# Patient Record
Sex: FEMALE | Race: WHITE | HISPANIC OR LATINO | Employment: UNEMPLOYED | URBAN - METROPOLITAN AREA
[De-identification: names, ages, dates, MRNs, and addresses within clinical notes are randomized per-mention and may not be internally consistent; named-entity substitution may affect disease eponyms.]

---

## 2017-03-22 ENCOUNTER — APPOINTMENT (OUTPATIENT)
Dept: OBGYN | Facility: CLINIC | Age: 22
End: 2017-03-22

## 2017-04-03 ENCOUNTER — HOSPITAL ENCOUNTER (OUTPATIENT)
Facility: MEDICAL CENTER | Age: 22
End: 2017-04-03
Attending: OBSTETRICS & GYNECOLOGY
Payer: COMMERCIAL

## 2017-04-03 ENCOUNTER — INITIAL PRENATAL (OUTPATIENT)
Dept: OBGYN | Facility: CLINIC | Age: 22
End: 2017-04-03
Payer: MEDICAID

## 2017-04-03 VITALS
HEIGHT: 62 IN | WEIGHT: 126 LBS | SYSTOLIC BLOOD PRESSURE: 102 MMHG | BODY MASS INDEX: 23.19 KG/M2 | DIASTOLIC BLOOD PRESSURE: 56 MMHG

## 2017-04-03 DIAGNOSIS — N39.0 URINARY TRACT INFECTION WITHOUT HEMATURIA, SITE UNSPECIFIED: ICD-10-CM

## 2017-04-03 DIAGNOSIS — Z34.90 ENCOUNTER FOR SUPERVISION OF NORMAL PREGNANCY, ANTEPARTUM, UNSPECIFIED GRAVIDITY: ICD-10-CM

## 2017-04-03 LAB
APPEARANCE UR: NORMAL
BILIRUB UR STRIP-MCNC: NORMAL MG/DL
COLOR UR AUTO: NORMAL
GLUCOSE UR STRIP.AUTO-MCNC: NEGATIVE MG/DL
KETONES UR STRIP.AUTO-MCNC: NEGATIVE MG/DL
LEUKOCYTE ESTERASE UR QL STRIP.AUTO: NORMAL
NITRITE UR QL STRIP.AUTO: POSITIVE
PH UR STRIP.AUTO: 6 [PH] (ref 5–8)
PROT UR QL STRIP: 30 MG/DL
RBC UR QL AUTO: NORMAL
SP GR UR STRIP.AUTO: 1.01
UROBILINOGEN UR STRIP-MCNC: NORMAL MG/DL

## 2017-04-03 PROCEDURE — 59402 PR NEW OB HIGH RISK: CPT | Performed by: OBSTETRICS & GYNECOLOGY

## 2017-04-03 PROCEDURE — 81002 URINALYSIS NONAUTO W/O SCOPE: CPT | Performed by: OBSTETRICS & GYNECOLOGY

## 2017-04-03 RX ORDER — NITROFURANTOIN 25; 75 MG/1; MG/1
100 CAPSULE ORAL 2 TIMES DAILY
Qty: 20 CAP | Refills: 0 | Status: SHIPPED | OUTPATIENT
Start: 2017-04-03 | End: 2017-05-18

## 2017-04-03 NOTE — PROGRESS NOTES
Pt here for New OB today  Phone: [phone]  Pharmacy verified  Pt is taking PNV  Pt had U/S on 12-30-16; AYLIN by U/S 6-12-17  Declines BTL  Pt reports good FM, denies any VB, LOF and UC

## 2017-04-03 NOTE — MR AVS SNAPSHOT
"        Gissell Richardson Colon   4/3/2017 3:00 PM   Initial Prenatal   MRN: [id number]    [facility]   Dept Phone:  [phone]    Description:  Female : 1995   Provider:  Sharlene Padgett M.D.           Allergies as of 4/3/2017     Allergen Noted Reactions    Nyquil 2011   Swelling      You were diagnosed with     Encounter for supervision of normal pregnancy, antepartum, unspecified    [[id number]]         Vital Signs     Blood Pressure Height Weight Body Mass Index Breastfeeding? Smoking Status    102/56 mmHg 1.575 m (5' 2\") 57.153 kg (126 lb) 23.04 kg/m2 Unknown Never Smoker       Basic Information     Date Of Birth Sex Race Ethnicity Preferred Language    1995 Female White  Origin (Mauritanian,Costa Rican,Uzbek,Tate, etc) English      Your appointments     2017  3:15 PM   OB Follow Up with IDALIA FERNANDEZ   [facility] [address] [id number] [phone]              Health Maintenance        Date Due Completion Dates    IMM HEP B VACCINE (1 of 3 - Primary Series) 1994 ---    IMM HEP A VACCINE (1 of 2 - Standard Series) 1995 ---    IMM HPV VACCINE (1 of 3 - Female 3 Dose Series) 2005 ---    IMM VARICELLA (CHICKENPOX) VACCINE (1 of 2 - 2 Dose Adolescent Series) 2007 ---    IMM DTaP/Tdap/Td Vaccine (1 - Tdap) 2013 ---    PAP SMEAR 2015            Results     POCT Urinalysis      Component Value Standard Range & Units    POC Color  Negative    POC Appearance  Negative    POC Leukocyte Esterase MODERATE Negative    POC Nitrites POSITIVE Negative    POC Urobiligen  Negative (0.2) mg/dL    POC Protein 30 Negative mg/dL    POC Urine PH 6.0 5.0 - 8.0    POC Blood HEMOLYZED Negative    POC Specific Gravity 1.015 <1.005 - >1.030    POC Ketones NEGATIVE Negative mg/dL    POC Biliruben  Negative mg/dL    POC Glucose NEGATIVE Negative mg/dL                        Current Immunizations     Influenza TIV " (IM) 2/2/2012  4:45 PM      Below and/or attached are the medications your provider expects you to take. Review all of your home medications and newly ordered medications with your provider and/or pharmacist. Follow medication instructions as directed by your provider and/or pharmacist. Please keep your medication list with you and share with your provider. Update the information when medications are discontinued, doses are changed, or new medications (including over-the-counter products) are added; and carry medication information at all times in the event of emergency situations     Allergies:  NYQUIL - Swelling               Medications  Valid as of: April 03, 2017 -  3:05 PM    Generic Name Brand Name Tablet Size Instructions for use    Doxylamine Succinate (Sleep) (Tab) UNISOM 25 MG Take 1 Tab by mouth every 6 hours as needed.        Prenatal Multivit-Min-Fe-FA   Take  by mouth.        .                 Medicines prescribed today were sent to:     [facility] [address] [place] [address] - [address] [facility]    [address] [place] [address]    Phone: [phone] Fax: [phone]    Open 24 Hours?: No      Medication refill instructions:       If your prescription bottle indicates you have medication refills left, it is not necessary to call your provider’s office. Please contact your pharmacy and they will refill your medication.    If your prescription bottle indicates you do not have any refills left, you may request refills at any time through one of the following ways: The online [employer] system (except Urgent Care), by calling your provider’s office, or by asking your pharmacy to contact your provider’s office with a refill request. Medication refills are processed only during regular business hours and may not be available until the next business day. Your provider may request additional information or to have a follow-up visit with you prior to refilling your medication.   *Please Note: Medication refills are  assigned a new Rx number when refilled electronically. Your pharmacy may indicate that no refills were authorized even though a new prescription for the same medication is available at the pharmacy. Please request the medicine by name with the pharmacy before contacting your provider for a refill.        Your To Do List     Future Labs/Procedures Complete By Expires    PREG CNTR PRENATAL PN  As directed 4/4/2018    THINPREP RFLX HPV ASCUS W/CTNG  As directed 4/4/2018    US-OB 2ND 3RD TRI COMPLETE  As directed 4/3/2018         [employer] Access Code: Activation code not generated  Current PredPol Status: Active

## 2017-04-03 NOTE — PROGRESS NOTES
"Subjective:      Gissell Colon is a 21 y.o. female who presents with No chief complaint on file.        CC: NOB    HPI 22 yo  @ 30+0/7 wks (unsure lmp, AYLIN by \"keepsake US\" at 16+4/7 wks - I reviewed photos and saw nothing measured that would determine dates).  She c/o dysuria.  No contractions, LOF, VB.  Fetus active.    ROS REVIEW OF SYSTEMS:    Pertinent positives and negatives mentioned in HPI.    All other systems reviewed and are negative.     Objective:     /56 mmHg  Ht 1.575 m (5' 2\")  Wt 57.153 kg (126 lb)  BMI 23.04 kg/m2  Breastfeeding? Unknown     Physical Exam     See prenatal physcial       Assessment/Plan:     1. Encounter for supervision of normal pregnancy, antepartum, unspecified   Continue PNV.  Declines CF testing.   - CONSENT FOR CYSTIC FIBROSIS CARRIER TEST  - POCT Urinalysis  - PREG CNTR PRENATAL PN; Future  - THINPREP RFLX HPV ASCUS W/CTNG; Future  - US-OB 2ND 3RD TRI COMPLETE; Future  - GLUCOSE 1HR GESTATIONAL    2. Urinary tract infection without hematuria, site unspecified  Will call if culture results not covered by macrobid.  - nitrofurantoin monohydr macro (MACROBID) 100 MG Cap; Take 1 Cap by mouth 2 times a day.  Dispense: 20 Cap; Refill: 0    3. S<D - poor dating criteria by \"keepsake US\" and unsure lmp.  Fetal survey ordered today.    F/U 2 weeks.         "

## 2017-04-06 LAB
C TRACH DNA GENITAL QL NAA+PROBE: NEGATIVE
CYTOLOGY REG CYTOL: NORMAL
N GONORRHOEA DNA GENITAL QL NAA+PROBE: NEGATIVE
SPECIMEN SOURCE: NORMAL

## 2017-04-10 ENCOUNTER — TELEPHONE (OUTPATIENT)
Dept: OBGYN | Facility: CLINIC | Age: 22
End: 2017-04-10

## 2017-04-10 NOTE — TELEPHONE ENCOUNTER
----- Message from Sharlene Padgett M.D. sent at 4/9/2017  9:00 AM PDT -----  Please inform patient of normal pap smear.  + for yeast - recommend 7 day OTC monostat if symptomatic.      4/10/17 1053 Left message for pt to call back.   4/14/17 1406 Left message for pt to call back.   1446 pt called back and notified as above. Pt states she is having itching and heavy white d/c. Advised to use Monistat 7 with x7days and to wash underwear with hot water and soap. Pt agreed and verbalized understanding.

## 2017-04-13 ENCOUNTER — APPOINTMENT (OUTPATIENT)
Dept: RADIOLOGY | Facility: IMAGING CENTER | Age: 22
End: 2017-04-13
Attending: OBSTETRICS & GYNECOLOGY

## 2017-04-13 DIAGNOSIS — Z34.90 ENCOUNTER FOR SUPERVISION OF NORMAL PREGNANCY, ANTEPARTUM, UNSPECIFIED GRAVIDITY: ICD-10-CM

## 2017-04-13 PROCEDURE — 76805 OB US >/= 14 WKS SNGL FETUS: CPT | Mod: 26 | Performed by: OBSTETRICS & GYNECOLOGY

## 2017-04-18 ENCOUNTER — ROUTINE PRENATAL (OUTPATIENT)
Dept: OBGYN | Facility: CLINIC | Age: 22
End: 2017-04-18

## 2017-04-18 ENCOUNTER — HOSPITAL ENCOUNTER (OUTPATIENT)
Dept: LAB | Facility: MEDICAL CENTER | Age: 22
End: 2017-04-18
Attending: OBSTETRICS & GYNECOLOGY
Payer: COMMERCIAL

## 2017-04-18 VITALS — WEIGHT: 127 LBS | DIASTOLIC BLOOD PRESSURE: 62 MMHG | BODY MASS INDEX: 23.22 KG/M2 | SYSTOLIC BLOOD PRESSURE: 100 MMHG

## 2017-04-18 DIAGNOSIS — Z34.03 ENCOUNTER FOR SUPERVISION OF NORMAL FIRST PREGNANCY IN THIRD TRIMESTER: ICD-10-CM

## 2017-04-18 DIAGNOSIS — Z34.90 ENCOUNTER FOR SUPERVISION OF NORMAL PREGNANCY, ANTEPARTUM, UNSPECIFIED GRAVIDITY: ICD-10-CM

## 2017-04-18 DIAGNOSIS — O09.30 LATE PRENATAL CARE: ICD-10-CM

## 2017-04-18 PROBLEM — Z34.93 SUPERVISION OF NORMAL PREGNANCY IN THIRD TRIMESTER: Status: ACTIVE | Noted: 2017-04-18

## 2017-04-18 LAB
ABO GROUP BLD: NORMAL
APPEARANCE UR: ABNORMAL
BACTERIA #/AREA URNS HPF: ABNORMAL /HPF
BASOPHILS # BLD AUTO: 0.2 % (ref 0–1.8)
BASOPHILS # BLD: 0.02 K/UL (ref 0–0.12)
BILIRUB UR QL STRIP.AUTO: NEGATIVE
BLD GP AB SCN SERPL QL: NORMAL
COLOR UR: YELLOW
CULTURE IF INDICATED INDCX: YES UA CULTURE
EOSINOPHIL # BLD AUTO: 0.05 K/UL (ref 0–0.51)
EOSINOPHIL NFR BLD: 0.5 % (ref 0–6.9)
EPI CELLS #/AREA URNS HPF: ABNORMAL /HPF
ERYTHROCYTE [DISTWIDTH] IN BLOOD BY AUTOMATED COUNT: 38.9 FL (ref 35.9–50)
GLUCOSE 1H P 50 G GLC PO SERPL-MCNC: 85 MG/DL (ref 70–139)
GLUCOSE UR STRIP.AUTO-MCNC: NEGATIVE MG/DL
HBV SURFACE AG SER QL: NEGATIVE
HCT VFR BLD AUTO: 35.3 % (ref 37–47)
HGB BLD-MCNC: 11.1 G/DL (ref 12–16)
HIV 1+2 AB+HIV1 P24 AG SERPL QL IA: NON REACTIVE
IMM GRANULOCYTES # BLD AUTO: 0.06 K/UL (ref 0–0.11)
IMM GRANULOCYTES NFR BLD AUTO: 0.7 % (ref 0–0.9)
KETONES UR STRIP.AUTO-MCNC: NEGATIVE MG/DL
LEUKOCYTE ESTERASE UR QL STRIP.AUTO: ABNORMAL
LYMPHOCYTES # BLD AUTO: 1.73 K/UL (ref 1–4.8)
LYMPHOCYTES NFR BLD: 18.9 % (ref 22–41)
MCH RBC QN AUTO: 25.6 PG (ref 27–33)
MCHC RBC AUTO-ENTMCNC: 31.4 G/DL (ref 33.6–35)
MCV RBC AUTO: 81.3 FL (ref 81.4–97.8)
MICRO URNS: ABNORMAL
MONOCYTES # BLD AUTO: 0.45 K/UL (ref 0–0.85)
MONOCYTES NFR BLD AUTO: 4.9 % (ref 0–13.4)
MUCOUS THREADS #/AREA URNS HPF: ABNORMAL /HPF
NEUTROPHILS # BLD AUTO: 6.84 K/UL (ref 2–7.15)
NEUTROPHILS NFR BLD: 74.8 % (ref 44–72)
NITRITE UR QL STRIP.AUTO: POSITIVE
NRBC # BLD AUTO: 0 K/UL
NRBC BLD AUTO-RTO: 0 /100 WBC
PH UR STRIP.AUTO: 6 [PH]
PLATELET # BLD AUTO: 297 K/UL (ref 164–446)
PMV BLD AUTO: 10.3 FL (ref 9–12.9)
PROT UR QL STRIP: NEGATIVE MG/DL
RBC # BLD AUTO: 4.34 M/UL (ref 4.2–5.4)
RBC # URNS HPF: ABNORMAL /HPF
RBC UR QL AUTO: NEGATIVE
RH BLD: NORMAL
RUBV AB SER QL: 55.6 IU/ML
SP GR UR STRIP.AUTO: 1.02
TREPONEMA PALLIDUM IGG+IGM AB [PRESENCE] IN SERUM OR PLASMA BY IMMUNOASSAY: NON REACTIVE
WBC # BLD AUTO: 9.2 K/UL (ref 4.8–10.8)
WBC #/AREA URNS HPF: ABNORMAL /HPF

## 2017-04-18 PROCEDURE — 90715 TDAP VACCINE 7 YRS/> IM: CPT | Performed by: OBSTETRICS & GYNECOLOGY

## 2017-04-18 PROCEDURE — 90471 IMMUNIZATION ADMIN: CPT | Performed by: OBSTETRICS & GYNECOLOGY

## 2017-04-18 PROCEDURE — 90040 PR PRENATAL FOLLOW UP: CPT | Performed by: OBSTETRICS & GYNECOLOGY

## 2017-04-18 NOTE — Clinical Note
"Count Your Baby's Movements  Another step to a healthy delivery    Gissell Richardson Colon             Dept: [phone]    How Many Weeks Pregnant 32w1d    Date to Begin Countin17              How to use this chart    One way for your physician to keep track of your baby's health is by knowing how often the baby moves (or \"kicks\") in your womb.  You can help your physician to do this by using this chart every day.    Every day, you should see how many hours it takes for your baby to move 10 times.  Start in the morning, as soon as you get up.    · First, write down the time your baby moves until you get to 10.  · Check off one box every time your baby moves until you get to 10.  · Write down the time you finished counting in the last column.  · Total how long it took to count up all 10 movements.  · Finally, fill in the box that shows how long this took.  After counting 10 movements, you no longer have to count any more that day.  The next morning, just start counting again as soon as you get up.    What should you call a \"movement\"?  It is hard to say, because it will feel different from one mother to another and from one pregnancy to the next.  The important thing is that you count the movements the same way throughout your pregnancy.  If you have more questions, you should ask your physician.    Count carefully every day!  SAMPLE:  Week 28    How many hours did it take to feel 10 movements?       Start  Time     1     2     3     4     5     6     7     8     9     10   Finish Time   Mon 8:20 ·  ·  ·  ·  ·  ·  ·  ·  ·  ·  11:40                  Sat               Sun                 IMPORTANT: You should contact your physician if it takes more than two hours for you to feel 10 movements.  Each morning, write down the time and start to count the movements of your baby.  Keep track by checking off one box every time you feel one movement.  When you have " "felt 10 \"kicks\", write down the time you finished counting in the last column.  Then fill in the   box (over the check adelita) for the number of hours it took.  Be sure to read the complete instructions on the previous page.            "

## 2017-04-18 NOTE — MR AVS SNAPSHOT
Gissell Colon   2017 3:15 PM   Routine Prenatal   MRN: [id number]    [facility]   Dept Phone:  [phone]    Description:  Female : 1995   Provider:  Sharlene Padgett M.D.           Allergies as of 2017     Allergen Noted Reactions    Nyquil 2011   Swelling      Vital Signs     Blood Pressure Weight Smoking Status             100/62 mmHg 57.607 kg (127 lb) Never Smoker          Basic Information     Date Of Birth Sex Race Ethnicity Preferred Language    1995 Female White  Origin (Estonian,Panamanian,Mosotho,Tate, etc) English      Health Maintenance        Date Due Completion Dates    IMM HEP B VACCINE (1 of 3 - Primary Series) 1994 ---    IMM HEP A VACCINE (1 of 2 - Standard Series) 1995 ---    IMM HPV VACCINE (1 of 3 - Female 3 Dose Series) 2005 ---    IMM VARICELLA (CHICKENPOX) VACCINE (1 of 2 - 2 Dose Adolescent Series) 2007 ---    IMM DTaP/Tdap/Td Vaccine (1 - Tdap) 2013 ---    PAP SMEAR 4/3/2020 4/3/2017, 2011            Current Immunizations     Influenza TIV (IM) 2012  4:45 PM      Below and/or attached are the medications your provider expects you to take. Review all of your home medications and newly ordered medications with your provider and/or pharmacist. Follow medication instructions as directed by your provider and/or pharmacist. Please keep your medication list with you and share with your provider. Update the information when medications are discontinued, doses are changed, or new medications (including over-the-counter products) are added; and carry medication information at all times in the event of emergency situations     Allergies:  NYQUIL - Swelling               Medications  Valid as of: 2017 -  3:57 PM    Generic Name Brand Name Tablet Size Instructions for use    Doxylamine Succinate (Sleep) (Tab) UNISOM 25 MG Take 1 Tab by mouth every 6 hours as needed.        Nitrofurantoin  Monohyd Macro (Cap) MACROBID 100 MG Take 1 Cap by mouth 2 times a day.        Prenatal Multivit-Min-Fe-FA   Take  by mouth.        .                 Medicines prescribed today were sent to:     [facility] - [place], [place] - [address] [place] [address]    Phone: [phone] Fax: [phone]    Open 24 Hours?: No      Medication refill instructions:       If your prescription bottle indicates you have medication refills left, it is not necessary to call your provider’s office. Please contact your pharmacy and they will refill your medication.    If your prescription bottle indicates you do not have any refills left, you may request refills at any time through one of the following ways: The online [employer] system (except Urgent Care), by calling your provider’s office, or by asking your pharmacy to contact your provider’s office with a refill request. Medication refills are processed only during regular business hours and may not be available until the next business day. Your provider may request additional information or to have a follow-up visit with you prior to refilling your medication.   *Please Note: Medication refills are assigned a new Rx number when refilled electronically. Your pharmacy may indicate that no refills were authorized even though a new prescription for the same medication is available at the pharmacy. Please request the medicine by name with the pharmacy before contacting your provider for a refill.           [employer] Access Code: Activation code not generated  Current Convers[employer] Status: Active

## 2017-04-18 NOTE — PROGRESS NOTES
Pt. Here for OB/F/U today Kick Count sheet given and explained to pt.   Good FM  Good # [phone]  Pt  States having more episodes of dizziness.   Declined BTL  Tdap vaccine given today, left deltoid. Screening checklist reviewed with pt.

## 2017-04-18 NOTE — PROGRESS NOTES
21 y.o.  32w1d The patient is here for routine obstetrical followup. She is without complaints and reports good fetal movement. She denies contractions, vaginal bleeding, or leaking of fluid.    The patient's pregnancy is complicated by There are no active problems to display for this patient.        Followup in {NUMBER 1-12:85855} weeks   labor precautions were discussed with patient  Fetal kick counts were discussed with patient  Quad screen today {YES / NO:89055} quad screen declined by patient {YES / NO:27950}  One-hour Glucola with CBC ordered today {YES / NO:51690}  GBS culture ordered today {YES / NO:28994}

## 2017-04-21 LAB
BACTERIA UR CULT: ABNORMAL
SIGNIFICANT IND 70042: ABNORMAL
SOURCE SOURCE: ABNORMAL

## 2017-04-24 ENCOUNTER — TELEPHONE (OUTPATIENT)
Dept: OBGYN | Facility: CLINIC | Age: 22
End: 2017-04-24

## 2017-04-24 RX ORDER — NITROFURANTOIN 25; 75 MG/1; MG/1
100 CAPSULE ORAL 2 TIMES DAILY
Qty: 14 CAP | Refills: 0 | Status: SHIPPED | OUTPATIENT
Start: 2017-04-24 | End: 2017-05-18

## 2017-04-24 NOTE — TELEPHONE ENCOUNTER
----- Message from Sharlene Padgett M.D. sent at 4/24/2017  8:36 AM PDT -----  Please call patient and tell her I have placed a script for Nitrofurantoin for UTI.      4/24/17 8975 Left message for pt to call back.   1001 Pt notified of UTI and needs to start on antibiotics, instructions given. Advised pt to increase water intake to minimum of 4 lt of water a day. Rx sent by provider. Pharmacy verified with pt.

## 2017-05-02 ENCOUNTER — ROUTINE PRENATAL (OUTPATIENT)
Dept: OBGYN | Facility: CLINIC | Age: 22
End: 2017-05-02

## 2017-05-02 VITALS — SYSTOLIC BLOOD PRESSURE: 104 MMHG | BODY MASS INDEX: 23.95 KG/M2 | WEIGHT: 131 LBS | DIASTOLIC BLOOD PRESSURE: 60 MMHG

## 2017-05-02 DIAGNOSIS — Z34.83 ENCOUNTER FOR SUPERVISION OF OTHER NORMAL PREGNANCY IN THIRD TRIMESTER: Primary | ICD-10-CM

## 2017-05-02 PROCEDURE — 90040 PR PRENATAL FOLLOW UP: CPT | Performed by: NURSE PRACTITIONER

## 2017-05-02 NOTE — PROGRESS NOTES
S:  Pt is  at 34w1d for routine OB follow up.  Reports occas CTX.  Reports good FM.  Denies VB, LOF, RUCs or vaginal DC.    O:  Please see above vitals.        FHTs: 135        Fundal ht: 32 cm.        S=D    A:  IUP at 34w1d  Patient Active Problem List    Diagnosis Date Noted   • Supervision of normal pregnancy in third trimester 2017   • Late prenatal care 2017        P:  1.  GBS @ 35 wks.          2.  Continue FKCs.          3.  Questions answered.          4.  Encouraged pt to tour L&D.          5.  Encourage adequate water intake.        6.  F/u 1 wks.

## 2017-05-02 NOTE — PATIENT INSTRUCTIONS
1.  GBS @ 35 wks.          2.  Continue FKCs.          3.  Questions answered.          4.  Encouraged pt to tour L&D.          5.  Encourage adequate water intake.        6.  F/u 1 wks.

## 2017-05-02 NOTE — PROGRESS NOTES
OB f/u today  + fetal movement.  No VB, LOF or UC's.  Good phone # [phone]  Preferred pharmacy confirmed.  Pt c/o Davon Saleem

## 2017-05-02 NOTE — MR AVS SNAPSHOT
Gissell Colon   2017 8:45 AM   Routine Prenatal   MRN: [id number]    [facility]   Dept Phone:  [phone]    Description:  Female : 1995   Provider:  YESSY Guillaume           Allergies as of 2017     Allergen Noted Reactions    Nyquil 2011   Swelling      You were diagnosed with     Encounter for supervision of other normal pregnancy in third trimester   [[id number]]  -  Primary       Vital Signs     Blood Pressure Weight Smoking Status             104/60 mmHg 59.421 kg (131 lb) Never Smoker          Basic Information     Date Of Birth Sex Race Ethnicity Preferred Language    1995 Female White  Origin (Qatari,Honduran,Irish,Chadian, etc) English      Your appointments     May 09, 2017 10:00 AM   OB Follow Up with YESSY Guillaume   [facility] [address]  [facility] [id number] [phone]              Problem List              ICD-10-CM Priority Class Noted - Resolved    Supervision of normal pregnancy in third trimester Z34.93   2017 - Present    Late prenatal care O09.30   2017 - Present      Health Maintenance        Date Due Completion Dates    IMM HEP B VACCINE (1 of 3 - Primary Series) 1994 ---    IMM HEP A VACCINE (1 of 2 - Standard Series) 1995 ---    IMM HPV VACCINE (1 of 3 - Female 3 Dose Series) 2005 ---    IMM VARICELLA (CHICKENPOX) VACCINE (1 of 2 - 2 Dose Adolescent Series) 2007 ---    PAP SMEAR 4/3/2020 4/3/2017, 2011    IMM DTaP/Tdap/Td Vaccine (2 - Td) 2027            Current Immunizations     Influenza TIV (IM) 2012  4:45 PM    Tdap Vaccine 2017      Below and/or attached are the medications your provider expects you to take. Review all of your home medications and newly ordered medications with your provider and/or pharmacist. Follow medication instructions as directed by your provider and/or pharmacist. Please keep your  medication list with you and share with your provider. Update the information when medications are discontinued, doses are changed, or new medications (including over-the-counter products) are added; and carry medication information at all times in the event of emergency situations     Allergies:  NYQUIL - Swelling               Medications  Valid as of: May 02, 2017 -  9:13 AM    Generic Name Brand Name Tablet Size Instructions for use    Doxylamine Succinate (Sleep) (Tab) UNISOM 25 MG Take 1 Tab by mouth every 6 hours as needed.        Nitrofurantoin Monohyd Macro (Cap) MACROBID 100 MG Take 1 Cap by mouth 2 times a day.        Nitrofurantoin Monohyd Macro (Cap) MACROBID 100 MG Take 1 Cap by mouth 2 times a day.        Prenatal Multivit-Min-Fe-FA   Take  by mouth.        .                 Medicines prescribed today were sent to:     [facility] [address] [place] [address], [place] - [address]    [address] [place] [address]    Phone: [phone] Fax: [phone]    Open 24 Hours?: No      Medication refill instructions:       If your prescription bottle indicates you have medication refills left, it is not necessary to call your provider’s office. Please contact your pharmacy and they will refill your medication.    If your prescription bottle indicates you do not have any refills left, you may request refills at any time through one of the following ways: The online [employer] system (except Urgent Care), by calling your provider’s office, or by asking your pharmacy to contact your provider’s office with a refill request. Medication refills are processed only during regular business hours and may not be available until the next business day. Your provider may request additional information or to have a follow-up visit with you prior to refilling your medication.   *Please Note: Medication refills are assigned a new Rx number when refilled electronically. Your pharmacy may indicate that no refills were authorized even though a  new prescription for the same medication is available at the pharmacy. Please request the medicine by name with the pharmacy before contacting your provider for a refill.        Instructions          1.  GBS @ 35 wks.          2.  Continue FKCs.          3.  Questions answered.          4.  Encouraged pt to tour L&D.          5.  Encourage adequate water intake.        6.  F/u 1 wks.          MyChart Access Code: Activation code not generated  Current MyChart Status: Active

## 2017-05-09 ENCOUNTER — HOSPITAL ENCOUNTER (OUTPATIENT)
Facility: MEDICAL CENTER | Age: 22
End: 2017-05-09
Attending: NURSE PRACTITIONER
Payer: COMMERCIAL

## 2017-05-09 ENCOUNTER — ROUTINE PRENATAL (OUTPATIENT)
Dept: OBGYN | Facility: CLINIC | Age: 22
End: 2017-05-09

## 2017-05-09 VITALS — DIASTOLIC BLOOD PRESSURE: 64 MMHG | SYSTOLIC BLOOD PRESSURE: 110 MMHG | BODY MASS INDEX: 23.95 KG/M2 | WEIGHT: 131 LBS

## 2017-05-09 DIAGNOSIS — Z34.83 ENCOUNTER FOR SUPERVISION OF OTHER NORMAL PREGNANCY IN THIRD TRIMESTER: ICD-10-CM

## 2017-05-09 DIAGNOSIS — Z34.83 ENCOUNTER FOR SUPERVISION OF OTHER NORMAL PREGNANCY IN THIRD TRIMESTER: Primary | ICD-10-CM

## 2017-05-09 PROCEDURE — 90040 PR PRENATAL FOLLOW UP: CPT | Performed by: NURSE PRACTITIONER

## 2017-05-09 PROCEDURE — 87653 STREP B DNA AMP PROBE: CPT

## 2017-05-09 NOTE — MR AVS SNAPSHOT
Gissell Colon   2017 10:00 AM   Routine Prenatal   MRN: [id number]    [facility]   Dept Phone:  [phone]    Description:  Female : 1995   Provider:  YESSY Guillaume           Allergies as of 2017     Allergen Noted Reactions    Nyquil 2011   Swelling      You were diagnosed with     Encounter for supervision of other normal pregnancy in third trimester   [[id number]]  -  Primary       Vital Signs     Blood Pressure Weight Smoking Status             110/64 mmHg 59.421 kg (131 lb) Never Smoker          Basic Information     Date Of Birth Sex Race Ethnicity Preferred Language    1995 Female White  Origin (Chinese,Rwandan,Turkish,Rwandan, etc) English      Problem List              ICD-10-CM Priority Class Noted - Resolved    Supervision of normal pregnancy in third trimester Z34.93   2017 - Present    Late prenatal care O09.30   2017 - Present      Health Maintenance        Date Due Completion Dates    IMM HEP B VACCINE (1 of 3 - Primary Series) 1994 ---    IMM HEP A VACCINE (1 of 2 - Standard Series) 1995 ---    IMM HPV VACCINE (1 of 3 - Female 3 Dose Series) 2005 ---    IMM VARICELLA (CHICKENPOX) VACCINE (1 of 2 - 2 Dose Adolescent Series) 2007 ---    PAP SMEAR 4/3/2020 4/3/2017, 2011    IMM DTaP/Tdap/Td Vaccine (2 - Td) 2027            Current Immunizations     Influenza TIV (IM) 2012  4:45 PM    Tdap Vaccine 2017      Below and/or attached are the medications your provider expects you to take. Review all of your home medications and newly ordered medications with your provider and/or pharmacist. Follow medication instructions as directed by your provider and/or pharmacist. Please keep your medication list with you and share with your provider. Update the information when medications are discontinued, doses are changed, or new medications (including over-the-counter products) are  added; and carry medication information at all times in the event of emergency situations     Allergies:  NYQUIL - Swelling               Medications  Valid as of: May 09, 2017 - 10:26 AM    Generic Name Brand Name Tablet Size Instructions for use    Doxylamine Succinate (Sleep) (Tab) UNISOM 25 MG Take 1 Tab by mouth every 6 hours as needed.        Nitrofurantoin Monohyd Macro (Cap) MACROBID 100 MG Take 1 Cap by mouth 2 times a day.        Nitrofurantoin Monohyd Macro (Cap) MACROBID 100 MG Take 1 Cap by mouth 2 times a day.        Prenatal Multivit-Min-Fe-FA   Take  by mouth.        .                 Medicines prescribed today were sent to:     [facility] [address] [place] [address], [place] - [address]    [address] [place] [address]    Phone: [phone] Fax: [phone]    Open 24 Hours?: No      Medication refill instructions:       If your prescription bottle indicates you have medication refills left, it is not necessary to call your provider’s office. Please contact your pharmacy and they will refill your medication.    If your prescription bottle indicates you do not have any refills left, you may request refills at any time through one of the following ways: The online [employer] system (except Urgent Care), by calling your provider’s office, or by asking your pharmacy to contact your provider’s office with a refill request. Medication refills are processed only during regular business hours and may not be available until the next business day. Your provider may request additional information or to have a follow-up visit with you prior to refilling your medication.   *Please Note: Medication refills are assigned a new Rx number when refilled electronically. Your pharmacy may indicate that no refills were authorized even though a new prescription for the same medication is available at the pharmacy. Please request the medicine by name with the pharmacy before contacting your provider for a refill.        Your To Do List      Future Labs/Procedures Complete By Expires    GRP B STREP, BY PCR (VARGAS BROTH)  As directed 5/9/2018    Comments:    Source: vaginal/rectal      Instructions          1.  GBS obtained.          2.  Labor precautions given.  Instructions given on where to go.  Pt receptive to              education.          3.  Questions answered.          4.  D/w pt policies about GBS.        5.  Continue FKCs.          6.  Encouraged adequate water intake        7.  F/u 1 wk.                  Other Notes About Your Plan     Baby Boy           MyChart Access Code: Activation code not generated  Current MyChart Status: Active

## 2017-05-09 NOTE — PATIENT INSTRUCTIONS
1.  GBS obtained.          2.  Labor precautions given.  Instructions given on where to go.  Pt receptive to              education.          3.  Questions answered.          4.  D/w pt policies about GBS.        5.  Continue FKCs.          6.  Encouraged adequate water intake        7.  F/u 1 wk.

## 2017-05-09 NOTE — PROGRESS NOTES
S:  Pt is  at 35w1d here for routine OB follow up.  Reports having CTXs every hour or so w increased pelvic pressure.  Reports good FM.  Denies VB, LOF, RUCs, or vaginal DC.     O:  Please see above vitals.        FHTs: 155        Fundal ht: 35 cm.        S=D        Fetal position: vertex.    A:  IUP at 35w1d  Patient Active Problem List    Diagnosis Date Noted   • Supervision of normal pregnancy in third trimester 2017   • Late prenatal care 2017       P:  1.  GBS obtained.          2.  Labor precautions given.  Instructions given on where to go.  Pt receptive to              education.          3.  Questions answered.          4.  D/w pt policies about GBS.        5.  Continue FKCs.          6.  Encouraged adequate water intake        7.  F/u 1 wk.

## 2017-05-09 NOTE — PROGRESS NOTES
OB f/u. + fetal movement.  No VB, LOF  C/o irregular UC's and pelvic pressure.  Wt: 131lblb      BP: 110/64  Good phone # [phone]  Preferred pharmacy confirmed.  GBS today

## 2017-05-11 LAB — GP B STREP DNA SPEC QL NAA+PROBE: NEGATIVE

## 2017-05-16 ENCOUNTER — HOSPITAL ENCOUNTER (OUTPATIENT)
Facility: MEDICAL CENTER | Age: 22
End: 2017-05-16
Attending: OBSTETRICS & GYNECOLOGY | Admitting: OBSTETRICS & GYNECOLOGY
Payer: MEDICAID

## 2017-05-16 VITALS
HEART RATE: 102 BPM | RESPIRATION RATE: 18 BRPM | SYSTOLIC BLOOD PRESSURE: 107 MMHG | TEMPERATURE: 97.8 F | DIASTOLIC BLOOD PRESSURE: 57 MMHG

## 2017-05-16 PROCEDURE — 59025 FETAL NON-STRESS TEST: CPT | Performed by: OBSTETRICS & GYNECOLOGY

## 2017-05-17 NOTE — PROGRESS NOTES
EDC 6-12-17 36.1 weeks pt is here with C/O contractions. External monitors applied, SVE done 3 cm 70% -2. Very posterior.  2200 pt is up to walk.  2255 pt back in bed EFM back on. SVE done no change. LEO Mcqueen was given report order received to discharge home.  2316 Pt was given instructions and discharged home with her mother.

## 2017-05-18 ENCOUNTER — ROUTINE PRENATAL (OUTPATIENT)
Dept: OBGYN | Facility: CLINIC | Age: 22
End: 2017-05-18

## 2017-05-18 VITALS — BODY MASS INDEX: 24.32 KG/M2 | WEIGHT: 133 LBS | SYSTOLIC BLOOD PRESSURE: 108 MMHG | DIASTOLIC BLOOD PRESSURE: 56 MMHG

## 2017-05-18 DIAGNOSIS — Z34.83 ENCOUNTER FOR SUPERVISION OF OTHER NORMAL PREGNANCY IN THIRD TRIMESTER: Primary | ICD-10-CM

## 2017-05-18 PROCEDURE — 90040 PR PRENATAL FOLLOW UP: CPT | Performed by: NURSE PRACTITIONER

## 2017-05-18 NOTE — MR AVS SNAPSHOT
Gissell Richardson Colon   2017 2:15 PM   Routine Prenatal   MRN: [id number]    [facility]   Dept Phone:  [phone]    Description:  Female : 1995   Provider:  Mell Junior C.N.M.           Allergies as of 2017     Allergen Noted Reactions    Nyquil 2011   Swelling      You were diagnosed with     Encounter for supervision of other normal pregnancy in third trimester   [[id number]]  -  Primary       Vital Signs     Blood Pressure Weight Smoking Status             108/56 mmHg 60.328 kg (133 lb) Never Smoker          Basic Information     Date Of Birth Sex Race Ethnicity Preferred Language    1995 Female White  Origin (Bur[place]ian,Sol[place] Islander,Maldivian,Nauruan, etc) English      Your appointments     May 22, 2017  3:15 PM   OB Follow Up with Mell Junior C.N.M.   [facility] [address]  [facility] [id number] [phone]              Problem List              ICD-10-CM Priority Class Noted - Resolved    Supervision of normal pregnancy in third trimester Z34.93   2017 - Present    Late prenatal care O09.30   2017 - Present      Health Maintenance        Date Due Completion Dates    IMM HEP B VACCINE (1 of 3 - Primary Series) 1994 ---    IMM HEP A VACCINE (1 of 2 - Standard Series) 1995 ---    IMM HPV VACCINE (1 of 3 - Female 3 Dose Series) 2005 ---    IMM VARICELLA (CHICKENPOX) VACCINE (1 of 2 - 2 Dose Adolescent Series) 2007 ---    PAP SMEAR 4/3/2020 4/3/2017, 2011    IMM DTaP/Tdap/Td Vaccine (2 - Td) 2027            Current Immunizations     Influenza TIV (IM) 2012  4:45 PM    Tdap Vaccine 2017      Below and/or attached are the medications your provider expects you to take. Review all of your home medications and newly ordered medications with your provider and/or pharmacist. Follow medication instructions as directed by your provider and/or pharmacist. Please keep  your medication list with you and share with your provider. Update the information when medications are discontinued, doses are changed, or new medications (including over-the-counter products) are added; and carry medication information at all times in the event of emergency situations     Allergies:  NYQUIL - Swelling               Medications  Valid as of: May 18, 2017 -  2:25 PM    Generic Name Brand Name Tablet Size Instructions for use    Prenatal Multivit-Min-Fe-FA   Take  by mouth.        .                 Medicines prescribed today were sent to:     [facility] [address] [place] [address] - [address]    [address] [place] [address]    Phone: [phone] Fax: [phone]    Open 24 Hours?: No      Medication refill instructions:       If your prescription bottle indicates you have medication refills left, it is not necessary to call your provider’s office. Please contact your pharmacy and they will refill your medication.    If your prescription bottle indicates you do not have any refills left, you may request refills at any time through one of the following ways: The online [email or web address] system (except Urgent Care), by calling your provider’s office, or by asking your pharmacy to contact your provider’s office with a refill request. Medication refills are processed only during regular business hours and may not be available until the next business day. Your provider may request additional information or to have a follow-up visit with you prior to refilling your medication.   *Please Note: Medication refills are assigned a new Rx number when refilled electronically. Your pharmacy may indicate that no refills were authorized even though a new prescription for the same medication is available at the pharmacy. Please request the medicine by name with the pharmacy before contacting your provider for a refill.        Instructions    P:  1.  Continue FKCs.         2.  Labor precautions given.  Instructions given on  where to go.  Pt receptive to education.         3.  Reviewed GBS status w pt.        4.  Questions answered.         5.  Encouraged adequate water intake       6.  F/u 1wk       7.  FYI: none.       Other Notes About Your Plan     Baby Boy           MyChart Access Code: Activation code not generated  Current MyChart Status: Active

## 2017-05-18 NOTE — PROGRESS NOTES
"Pt here today for OB follow up  Reports +FM  WT: 133 lb  BP: 108/56  Pt states has been having contractinos since 2 days ago, \"today contractions are stronger\"  Pt states no complaints today  Good # [phone]    "

## 2017-05-18 NOTE — PROGRESS NOTES
S:  Pt is  at 36w3d here for routine OB follow up.  Reports UCs q30min.  Reports good FM.  Denies VB, LOF, RUCs, or vaginal DC.     O:  Please see above vitals.        FHTs: 140        Fundal ht: 35        Fetal position: vertex        SVE: deferred        GBS neg on 17 -- reviewed w pt.      A:  IUP at 36w3d  Patient Active Problem List    Diagnosis Date Noted   • Supervision of normal pregnancy in third trimester 2017   • Late prenatal care 2017       P:  1.  Continue FKCs.         2.  Labor precautions given.  Instructions given on where to go.  Pt receptive to education.         3.  Reviewed GBS status w pt.        4.  Questions answered.         5.  Encouraged adequate water intake       6.  F/u 1wk       7.  FYI: none.

## 2017-05-18 NOTE — PATIENT INSTRUCTIONS
P:  1.  Continue FKCs.         2.  Labor precautions given.  Instructions given on where to go.  Pt receptive to education.         3.  Reviewed GBS status w pt.        4.  Questions answered.         5.  Encouraged adequate water intake       6.  F/u 1wk       7.  FYI: none.

## 2017-05-27 ENCOUNTER — HOSPITAL ENCOUNTER (INPATIENT)
Facility: MEDICAL CENTER | Age: 22
LOS: 2 days | End: 2017-05-29
Attending: OBSTETRICS & GYNECOLOGY | Admitting: OBSTETRICS & GYNECOLOGY
Payer: MEDICAID

## 2017-05-27 LAB
BASOPHILS # BLD AUTO: 0.3 % (ref 0–1.8)
BASOPHILS # BLD: 0.03 K/UL (ref 0–0.12)
EOSINOPHIL # BLD AUTO: 0.02 K/UL (ref 0–0.51)
EOSINOPHIL NFR BLD: 0.2 % (ref 0–6.9)
ERYTHROCYTE [DISTWIDTH] IN BLOOD BY AUTOMATED COUNT: 39.4 FL (ref 35.9–50)
HCT VFR BLD AUTO: 29.7 % (ref 37–47)
HGB BLD-MCNC: 9.4 G/DL (ref 12–16)
HOLDING TUBE BB 8507: NORMAL
IMM GRANULOCYTES # BLD AUTO: 0.04 K/UL (ref 0–0.11)
IMM GRANULOCYTES NFR BLD AUTO: 0.4 % (ref 0–0.9)
LYMPHOCYTES # BLD AUTO: 2.1 K/UL (ref 1–4.8)
LYMPHOCYTES NFR BLD: 22.2 % (ref 22–41)
MCH RBC QN AUTO: 23.6 PG (ref 27–33)
MCHC RBC AUTO-ENTMCNC: 31.6 G/DL (ref 33.6–35)
MCV RBC AUTO: 74.4 FL (ref 81.4–97.8)
MONOCYTES # BLD AUTO: 0.68 K/UL (ref 0–0.85)
MONOCYTES NFR BLD AUTO: 7.2 % (ref 0–13.4)
NEUTROPHILS # BLD AUTO: 6.58 K/UL (ref 2–7.15)
NEUTROPHILS NFR BLD: 69.7 % (ref 44–72)
NRBC # BLD AUTO: 0 K/UL
NRBC BLD AUTO-RTO: 0 /100 WBC
PLATELET # BLD AUTO: 305 K/UL (ref 164–446)
PMV BLD AUTO: 9.6 FL (ref 9–12.9)
RBC # BLD AUTO: 3.99 M/UL (ref 4.2–5.4)
WBC # BLD AUTO: 9.5 K/UL (ref 4.8–10.8)

## 2017-05-27 PROCEDURE — 700111 HCHG RX REV CODE 636 W/ 250 OVERRIDE (IP): Performed by: FAMILY MEDICINE

## 2017-05-27 PROCEDURE — 4A1HXCZ MONITORING OF PRODUCTS OF CONCEPTION, CARDIAC RATE, EXTERNAL APPROACH: ICD-10-PCS | Performed by: OBSTETRICS & GYNECOLOGY

## 2017-05-27 PROCEDURE — 700105 HCHG RX REV CODE 258: Performed by: FAMILY MEDICINE

## 2017-05-27 PROCEDURE — 700111 HCHG RX REV CODE 636 W/ 250 OVERRIDE (IP)

## 2017-05-27 PROCEDURE — 85025 COMPLETE CBC W/AUTO DIFF WBC: CPT

## 2017-05-27 PROCEDURE — 770002 HCHG ROOM/CARE - OB PRIVATE (112)

## 2017-05-27 PROCEDURE — A9270 NON-COVERED ITEM OR SERVICE: HCPCS | Performed by: FAMILY MEDICINE

## 2017-05-27 PROCEDURE — 700102 HCHG RX REV CODE 250 W/ 637 OVERRIDE(OP): Performed by: FAMILY MEDICINE

## 2017-05-27 RX ORDER — ROPIVACAINE HYDROCHLORIDE 2 MG/ML
INJECTION, SOLUTION EPIDURAL; INFILTRATION; PERINEURAL
Status: COMPLETED
Start: 2017-05-27 | End: 2017-05-27

## 2017-05-27 RX ORDER — METHYLERGONOVINE MALEATE 0.2 MG/ML
0.2 INJECTION INTRAVENOUS
Status: DISCONTINUED | OUTPATIENT
Start: 2017-05-27 | End: 2017-05-28 | Stop reason: HOSPADM

## 2017-05-27 RX ORDER — SODIUM CHLORIDE, SODIUM LACTATE, POTASSIUM CHLORIDE, CALCIUM CHLORIDE 600; 310; 30; 20 MG/100ML; MG/100ML; MG/100ML; MG/100ML
INJECTION, SOLUTION INTRAVENOUS CONTINUOUS
Status: DISPENSED | OUTPATIENT
Start: 2017-05-27 | End: 2017-05-28

## 2017-05-27 RX ORDER — ONDANSETRON 4 MG/1
4 TABLET, ORALLY DISINTEGRATING ORAL EVERY 6 HOURS PRN
Status: DISCONTINUED | OUTPATIENT
Start: 2017-05-27 | End: 2017-05-29 | Stop reason: HOSPADM

## 2017-05-27 RX ORDER — METOCLOPRAMIDE HYDROCHLORIDE 5 MG/ML
10 INJECTION INTRAMUSCULAR; INTRAVENOUS EVERY 6 HOURS PRN
Status: DISCONTINUED | OUTPATIENT
Start: 2017-05-27 | End: 2017-05-29 | Stop reason: HOSPADM

## 2017-05-27 RX ORDER — BUPIVACAINE HYDROCHLORIDE 2.5 MG/ML
INJECTION, SOLUTION EPIDURAL; INFILTRATION; INTRACAUDAL
Status: ACTIVE
Start: 2017-05-27 | End: 2017-05-28

## 2017-05-27 RX ORDER — DOCUSATE SODIUM 100 MG/1
100 CAPSULE, LIQUID FILLED ORAL 2 TIMES DAILY PRN
Status: CANCELLED | OUTPATIENT
Start: 2017-05-27

## 2017-05-27 RX ORDER — ALUMINA, MAGNESIA, AND SIMETHICONE 2400; 2400; 240 MG/30ML; MG/30ML; MG/30ML
30 SUSPENSION ORAL EVERY 6 HOURS PRN
Status: DISCONTINUED | OUTPATIENT
Start: 2017-05-27 | End: 2017-05-28 | Stop reason: HOSPADM

## 2017-05-27 RX ORDER — MISOPROSTOL 200 UG/1
800 TABLET ORAL
Status: COMPLETED | OUTPATIENT
Start: 2017-05-27 | End: 2017-05-27

## 2017-05-27 RX ORDER — OXYTOCIN 10 [USP'U]/ML
10 INJECTION, SOLUTION INTRAMUSCULAR; INTRAVENOUS
Status: DISCONTINUED | OUTPATIENT
Start: 2017-05-27 | End: 2017-05-28 | Stop reason: HOSPADM

## 2017-05-27 RX ORDER — SODIUM CHLORIDE, SODIUM LACTATE, POTASSIUM CHLORIDE, CALCIUM CHLORIDE 600; 310; 30; 20 MG/100ML; MG/100ML; MG/100ML; MG/100ML
INJECTION, SOLUTION INTRAVENOUS PRN
Status: CANCELLED | OUTPATIENT
Start: 2017-05-27

## 2017-05-27 RX ORDER — ONDANSETRON 2 MG/ML
4 INJECTION INTRAMUSCULAR; INTRAVENOUS EVERY 6 HOURS PRN
Status: DISCONTINUED | OUTPATIENT
Start: 2017-05-27 | End: 2017-05-29 | Stop reason: HOSPADM

## 2017-05-27 RX ORDER — BISACODYL 10 MG
10 SUPPOSITORY, RECTAL RECTAL PRN
Status: CANCELLED | OUTPATIENT
Start: 2017-05-27

## 2017-05-27 RX ADMIN — SODIUM CHLORIDE, POTASSIUM CHLORIDE, SODIUM LACTATE AND CALCIUM CHLORIDE: 600; 310; 30; 20 INJECTION, SOLUTION INTRAVENOUS at 17:35

## 2017-05-27 RX ADMIN — SODIUM CHLORIDE, POTASSIUM CHLORIDE, SODIUM LACTATE AND CALCIUM CHLORIDE: 600; 310; 30; 20 INJECTION, SOLUTION INTRAVENOUS at 18:30

## 2017-05-27 RX ADMIN — Medication 2000 ML/HR: at 23:30

## 2017-05-27 RX ADMIN — MISOPROSTOL 800 MCG: 200 TABLET ORAL at 23:53

## 2017-05-27 RX ADMIN — ROPIVACAINE HYDROCHLORIDE 200 MG: 2 INJECTION, SOLUTION EPIDURAL; INFILTRATION at 18:27

## 2017-05-27 RX ADMIN — Medication 1 UNITS: at 22:26

## 2017-05-27 ASSESSMENT — LIFESTYLE VARIABLES
EVER_SMOKED: NEVER
DO YOU DRINK ALCOHOL: NO
ALCOHOL_USE: NO

## 2017-05-27 NOTE — H&P
[facility] LABOR AND DELIVERY HISTORY AND PHYSICAL    PATIENT ID:  NAME:  Gissell Colon  MRN:               [id number]  YOB: 1995    CC:  contractions    HPI:  Gissell Colon is a 21 y.o. female  at 37w5d by a 16 week ultrasound performed/LMP on No LMP recorded. Patient is pregnant.. Estimated Date of Delivery: 17  Patient presents complaining of positive uterine contractions, with no loss of fluid.  Normal fetal movement.  no vaginal bleeding.  Pregnancy was complicated    ROS: Patient denies any fever chills, nausea, vomiting, headache, chest pain, shortness of breath, or dysuria or unusual swelling of hands or feet.     Prenatal Care: Obtained at [facility], 1st visit @ 30w with 5 total visits.  Third trimester BPs were approximately 100-110s/50-60s. during the pregnancy.    Prenatal Labs:   HepBsAg: negative HIV: negative Rubella: immune   RPR: NR PAP:  GBS: neg   GC/CT:  O+/ Ab neg Quad Screen:    No results for input(s): WBC, RBC, HEMOGLOBIN, HEMATOCRIT, MCV, MCH, RDW, PLATELETCT, MPV, NEUTSPOLYS, LYMPHOCYTES, MONOCYTES, EOSINOPHILS, BASOPHILS, RBCMORPHOLO in the last 72 hours.  No results for input(s): SODIUM, POTASSIUM, CHLORIDE, CO2, GLUCOSE, BUN, CPKTOTAL in the last 72 hours.       IMAGING:   OB ultrasound:   2017 9:01 AM    HISTORY/REASON FOR EXAM:  Evaluate fetal anatomy, size less than dates, late care    TECHNIQUE/EXAM DESCRIPTION: OB complete ultrasound.    COMPARISON:  None    FINDINGS:  Fetal Lie:  Vertex  LMP:  Unknown  Clinical AYLIN by LMP:  Not applicable    Placenta (Location):  Anterior  Placenta Previa: No  Placental Grade: I    Amniotic Fluid Volume:  TERRY = 15.13 cm    Fetal Heart Rate:  141 bpm    Cervical Length:  3.19 cm transabdominal    No maternal adnexal mass is identified.    Umbilical Artery S/D Ratio(s):  Not applicable    Fetal Anatomy  (Seen or Not Seen)  Lateral Ventricles     Seen  Cisterna Magna        Seen  Cerebellum              Seen  CSP              Seen  Orbits             Seen  Face/Lips                Seen  Cord Insertion         Seen  Placental CI         Seen  4 Chamber Heart     Seen  LVOT               Seen  RVOT              Seen  Stomach       Seen  Kidneys                   Seen  Urinary Bladder      Seen  Spine                       Seen  3 Vessel Cord          Seen  Both Upper Extremities    Seen  Both Lower Extremities    Seen  Diaphragm             Seen  Movement       Seen  Gender:  Likely male    Fetal Biometry  BPD    8.01 cm, 32 weeks, 1 day  HC    28.75 cm, 31 weeks, 4 days  AC    26.74 cm, 30 weeks, 6 days  Femur Length    5.57 cm, 29 weeks, 2 days  Humerus Length    5.03 cm, 29 weeks, 6 days  Cerebellum Diameter   4.11 cm    EGA by this US:  30 weeks, 5 days  AYLIN by this US: 2017  AYLIN by 1st US:  2017    Estimated Fetal Weight:  1593 grams    Comments:         Impression        Single intrauterine pregnancy of an estimated gestational age of 30 weeks, 5 days with an estimated date of delivery of 2017.    Echogenic focus is identified and fetal left ventricle. Femur length and humerus length are slightly less than other measurements. No other anatomic abnormalities are identified.         POB Hx:  OB History    Para Term  AB SAB TAB Ectopic Multiple Living   4 3 3 0      3      # Outcome Date GA Lbr José Luis/2nd Weight Sex Delivery Anes PTL Lv   4 Current            3 Term 16 39w5d  2.268 kg (5 lb) F Vag-Spont  N Y      Comments: No complications   2 Term 13 38w1d  2.835 kg (6 lb 4 oz) M Vag-Spont EPI  Y      Comments: System Generated. Please review and update pregnancy details.   1 Term 12 39w4d  3.118 kg (6 lb 14 oz) M Vag-Vacuum EPI N Y      Comments: Pt states no complications          PMH/Problem List:    Past Medical History   Diagnosis Date   • N&V - nausea and vomiting 2011   • UTI (urinary tract infection)      admitted for 6 days early      Patient Active Problem List     "Diagnosis Date Noted   • Supervision of normal pregnancy in third trimester 2017   • Late prenatal care 2017       Current Outpatient Medications:  No current facility-administered medications on file prior to encounter.     Current Outpatient Prescriptions on File Prior to Encounter   Medication Sig Dispense Refill   • Prenatal Multivit-Min-Fe-FA (PRENATAL VITAMINS PO) Take  by mouth.         PSH:    No past surgical history on file.    Allergies:   Allergies   Allergen Reactions   • Nyquil Swelling       SH:  Social History     Social History   • Marital Status: Single     Spouse Name: N/A   • Number of Children: N/A   • Years of Education: N/A     Occupational History   • Not on file.     Social History Main Topics   • Smoking status: Never Smoker    • Smokeless tobacco: Not on file   • Alcohol Use: No   • Drug Use: No   • Sexual Activity:     Partners: Male     Birth Control/ Protection: Pill     Other Topics Concern   • Not on file     Social History Narrative         PHYSICAL EXAM:  Filed Vitals:    17 1548   BP: 106/55   Pulse: 89   Temp: 36.9 °C (98.5 °F)   TempSrc: Temporal   Resp: 18   Height: 1.575 m (5' 2\")   Weight: 61.236 kg (135 lb)     Temp (24hrs), Av.9 °C (98.5 °F), Min:36.9 °C (98.5 °F), Max:36.9 °C (98.5 °F)    General: No acute distress, resting comfortably in bed.  HEENT: normocephalic, nontraumatic, PERRLA, EOMI  Cardiovascular: Heart RRR with no murmurs, rubs or gallops. Distal Pulses 2+  Respiratory: symmetric chest expansion, lungs CTA bilaterally with no wheezes rales or rhonci  Abdomen: gravid, nontender  Musculoskeletal: strength 5/5 in four extremities  Neuro: non focal with no numbness, tingling or changes in sensation    SVE: 6/70%/-1  [place]: Q10 minutes; EFM: 140s with accels, no decels, moderate variability    A/P: Intrauterine pregancy at 37w5d weeks in active labor here for anticipated .      Patient Active Problem List    Diagnosis Date Noted   • " Supervision of normal pregnancy in third trimester 2017   • Late prenatal care 2017       1. IUP at term  2. Patient is GBS neg  3. Anticipating     Hank Rowland M.D.

## 2017-05-27 NOTE — IP AVS SNAPSHOT
[employer] Access Code: Activation code not generated  Current [employer] Status: Active    [email or web address]  A secure, online tool to manage your health information     [employer]’s [employer]® is a secure, online tool that connects you to your personalized health information from the privacy of your home -- day or night - making it very easy for you to manage your healthcare. Once the activation process is completed, you can even access your medical information using the [employer] gracie, which is available for free in the Apple Gracie store or Google Play store.     [employer] provides the following levels of access (as shown below):   My Chart Features   [facility] Primary Care Doctor [facility]  Specialists [facility]  Urgent  Care Non-[facility]  Primary Care  Doctor   Email your healthcare team securely and privately 24/7 X X X X   Manage appointments: schedule your next appointment; view details of past/upcoming appointments X      Request prescription refills. X      View recent personal medical records, including lab and immunizations X X X X   View health record, including health history, allergies, medications X X X X   Read reports about your outpatient visits, procedures, consult and ER notes X X X X   See your discharge summary, which is a recap of your hospital and/or ER visit that includes your diagnosis, lab results, and care plan. X X       How to register for [employer]:  1. Go to  [email or web address].  2. Click on the Sign Up Now box, which takes you to the New Member Sign Up page. You will need to provide the following information:  a. Enter your [employer] Access Code exactly as it appears at the top of this page. (You will not need to use this code after you’ve completed the sign-up process. If you do not sign up before the expiration date, you must request a new code.)   b. Enter your date of birth.   c. Enter your home email address.   d. Click Submit, and follow the next screen’s instructions.  3. Create a [employer] ID. This will  be your [employer] login ID and cannot be changed, so think of one that is secure and easy to remember.  4. Create a [employer] password. You can change your password at any time.  5. Enter your Password Reset Question and Answer. This can be used at a later time if you forget your password.   6. Enter your e-mail address. This allows you to receive e-mail notifications when new information is available in [employer].  7. Click Sign Up. You can now view your health information.    For assistance activating your [employer] account, call [phone]

## 2017-05-27 NOTE — PROGRESS NOTES
20 yo  edc 17, 37.5 presents with c/o UCs q 10-15 min apart all day and has lost her mucus plug. Pos fm. Denies LOF or vag bleeding. SVE /-2 vertex. Dr. Rowland updated. Okay to ambulate x 1 hour.  164 Pt returned from walking, Dr. Rowland at bedside. SVE /-1. Pt to be admitted to [facility] for delivery.   Report to LIZZETH Au, at bedside. Pt transferred, ambulatory, to [facility].

## 2017-05-27 NOTE — IP AVS SNAPSHOT
Home Care Instructions                                                                                                                Gissell Colon   MRN: [id number]    Department:  POST PARTUM 31   2017           Your appointments     May 30, 2017  8:00 AM   OB Follow Up with RG Veronica   [facility] ([facility])    [address]  [place] [address]   [phone]              Follow-up Information     1. Follow up with [facility]. Schedule an appointment as soon as possible for a visit in 5 weeks.    Specialty:  OB/Gyn    Contact information    [address]  [place] [address]  [phone]    Additional information:    From  [address], take the [facility] exit (# [address]) and head West on [address].   Just before [facility], take the slight left fork onto [address]   [facility] is located on the right hand side, just past [facility].       I assume responsibility for securing a follow-up [place] Screening blood test on my baby within the specified date range.    -                  Discharge Instructions       POSTPARTUM DISCHARGE INSTRUCTIONS FOR MOM    YOB: 1995   Age: 21 y.o.               Admit Date: 2017     Discharge Date: 2017  Attending Doctor:  Jasiel Wade M.D.                  Allergies:  Nyquil    Discharged to home by car. Discharged via wheelchair, hospital escort: Yes.  Special equipment needed: Not Applicable  Belongings with: Personal  Be sure to schedule a follow-up appointment with your primary care doctor or any specialists as instructed.     Discharge Plan:   Diet Plan: Discussed  Activity Level: Discussed  Confirmed Follow up Appointment: Patient to Call and Schedule Appointment  Confirmed Symptoms Management: Discussed  Medication Reconciliation Updated: Yes  Influenza Vaccine Indication: Not indicated: Previously immunized this influenza season and > 8 years of age    REASONS TO CALL YOUR  "OBSTETRICIAN:  1.   Persistent fever or shaking chills (Temperature higher than 100.4)  2.   Heavy bleeding (soaking more than 1 pad per hour); Passing clots  3.   Foul odor from vagina  4.   Mastitis (Breast infection; breast pain, chills, fever, redness)  5.   Urinary pain, burning or frequency  6.   Episiotomy infection  7.   Severe depression longer than 24 hours    HAND WASHING  · Prior to handling the baby.  · Before breastfeeding or bottle feeding baby.  · After using the bathroom or changing the baby's diaper.    VAGINAL CARE  · Nothing inside vagina for 6 weeks: no sexual intercourse, tampons or douching.  · Bleeding may continue for 2-4 weeks.  Amount may vary.    · Call your physician for heavy bleeding which means soaking more than 1 pad per hour    BIRTH CONTROL  · It is possible to become pregnant at any time after delivery and while breastfeeding.  · Plan to discuss a method of birth control with your physician at your follow up visit. visit.    DIET AND ELIMINATION  · Eating more fiber (bran cereal, fruits, and vegetables) and drinking plenty of fluids will help to avoid constipation.  · Urinary frequency after childbirth is normal.    POSTPARTUM BLUES  During the first few days after birth, you may experience a sense of the \"blues\" which may include impatience, irritability or even crying.  These feeling come and go quickly.  However, as many as 1 in 10 women experience emotional symptoms known as postpartum depression.    Postpartum depression:  May start as early as the second or third day after delivery or take several weeks or months to develop.  Symptoms of \"blues\" are present, but are more intense:  Crying spells; loss of appetite; feelings of hopelessness or loss of control; fear of touching the baby; over concern or no concern at all about the baby; little or no concern about your own appearance/caring for yourself; and/or inability to sleep or excessive sleeping.  Contact your physician if " "you are experiencing any of these symptoms.    Crisis Hotline:  · [place] Crisis Hotline:  2-962-MIBVEGK  Or 1-101.719.9869  · [place] Crisis Hotline:  1-608.843.6635  Or 943-202-9320    PREVENTING SHAKEN BABY:  If you are angry or stressed, PUT THE BABY IN THE CRIB, step away, take some deep breaths, and wait until you are calm to care for the baby.  DO NOT SHAKE THE BABY.  You are not alone, call a supporter for help.    · Crisis Call Center 24/7 crisis line 592-869-9246 or 1-540.917.5519  · You can also text them, text \"ANSWER\" to 820529    QUIT SMOKING/TOBACCO USE:  I understand the use of any tobacco products increases my chance of suffering from future heart disease and could cause other illnesses which may shorten my life. Quitting the use of tobacco products is the single most important thing I can do to improve my health. For further information on smoking / tobacco cessation call a Toll Free Quit Line at 1-162.408.1153 (*National Cancer [place]) or 1-417.130.5479 (American Lung Association) or you can access the web based program at www.lungusa.org.    · [place] Tobacco Users Help Line:  (488) 257-3739       Toll Free: 1-495.632.4885  · Quit Tobacco Program [facility] (040)090-1533    DEPRESSION / SUICIDE RISK:  As you are discharged from this [facility], it is important to learn how to keep safe from harming yourself.    Recognize the warning signs:  · Abrupt changes in personality, positive or negative- including increase in energy   · Giving away possessions  · Change in eating patterns- significant weight changes-  positive or negative  · Change in sleeping patterns- unable to sleep or sleeping all the time   · Unwillingness or inability to communicate  · Depression  · Unusual sadness, discouragement and loneliness  · Talk of wanting to die  · Neglect of personal appearance   · Rebelliousness- reckless behavior  · Withdrawal from people/activities they " love  · Confusion- inability to concentrate     If you or a loved one observes any of these behaviors or has concerns about self-harm, here's what you can do:  · Talk about it- your feelings and reasons for harming yourself  · Remove any means that you might use to hurt yourself (examples: pills, rope, extension cords, firearm)  · Get professional help from the community (Mental Health, Substance Abuse, psychological counseling)  · Do not be alone:Call your Safe Contact- someone whom you trust who will be there for you.  · Call your local CRISIS HOTLINE 957-1471 or 882-907-4664  · Call your local Children's Mobile Crisis Response Team [place] (621) 776-7766 or www.Kodable  · Call the toll free National Suicide Prevention Hotlines   · National Suicide Prevention Lifeline 681-410-TDOL (1536)  · National Hope Line Network 800-SUICIDE (337-7301)    DISCHARGE SURVEY:  Thank you for choosing [facility].  We hope we provided you with very good care.  You may be receiving a survey in the mail.  Please fill it out.  Your opinion is valuable to us.    ADDITIONAL EDUCATIONAL MATERIALS GIVEN TO PATIENT:        My signature on this form indicates that:  1.  I have reviewed and understand the above information  2.  My questions regarding this information have been answered to my satisfaction.  3.  I have formulated a plan with my discharge nurse to obtain my prescribed medication for home.         Discharge Medication Instructions:    Below are the medications your physician expects you to take upon discharge:    Review all your home medications and newly ordered medications with your doctor and/or pharmacist. Follow medication instructions as directed by your doctor and/or pharmacist.    Please keep your medication list with you and share with your physician.               Medication List      START taking these medications        Instructions    Morning Afternoon Evening Bedtime    ferrous sulfate 325 (65 FE)  MG tablet   Last time this was given:  325 mg on 5/29/2017  8:05 AM        Take 1 Tab by mouth every morning with breakfast.   Dose:  325 mg                        ibuprofen 600 MG Tabs   Last time this was given:  600 mg on 5/29/2017  8:05 AM   Commonly known as:  MOTRIN        Take 1 Tab by mouth every 6 hours as needed (For cramping after delivery; do not give if patient is receiving ketorolac (Toradol)).   Dose:  600 mg                          CONTINUE taking these medications        Instructions    Morning Afternoon Evening Bedtime    PRENATAL VITAMINS PO        Take  by mouth.                             Where to Get Your Medications      These medications were sent to [facility]  [place] - [address]  [address] [place] [address]     Phone:  [phone]    - ferrous sulfate 325 (65 FE) MG tablet  - ibuprofen 600 MG Tabs            Crisis Hotline:     [place] Crisis Hotline:  8-722-DQWJROF or 1-196.736.9974    [place] Crisis Hotline:    1-466.994.7014 or 945-269-9317        Disclaimer           _____________________________________                     __________       ________       Patient/Mother Signature or Legal                          Date                   Time

## 2017-05-27 NOTE — IP AVS SNAPSHOT
5/29/2017    Gissell Colon  [address] [place] [address]  [place] [address]    Dear Gissell:    [facility] wants to ensure your discharge home is safe and you or your loved ones have had all of your questions answered regarding your care after you leave the hospital.    Below is a list of resources and contact information should you have any questions regarding your hospital stay, follow-up instructions, or active medical symptoms.    Questions or Concerns Regarding… Contact   Medical Questions Related to Your Discharge  (7 days a week, 8am-5pm) Contact a Nurse Care Coordinator   [phone]   Medical Questions Not Related to Your Discharge  (24 hours a day / 7 days a week)  Contact the Nurse Health Line   [phone]    Medications or Discharge Instructions Refer to your discharge packet   or contact your [facility] Primary Care Provider   [phone]   Follow-up Appointment(s) Schedule your appointment via [employer]   or contact Scheduling [phone]   Billing Review your statement via [employer]  or contact Billing [phone]   Medical Records Review your records via [employer]   or contact Medical Records [phone]     You may receive a telephone call within two days of discharge. This call is to make certain you understand your discharge instructions and have the opportunity to have any questions answered. You can also easily access your medical information, test results and upcoming appointments via the [employer] free online health management tool. You can learn more and sign up at [employer] Tech. For assistance setting up your [employer] account, please call [phone].    Once again, we want to ensure your discharge home is safe and that you have a clear understanding of any next steps in your care. If you have any questions or concerns, please do not hesitate to contact us, we are here for you. Thank you for choosing [facility] for your healthcare needs.    Sincerely,    Your [facility] Healthcare Team

## 2017-05-28 LAB
ERYTHROCYTE [DISTWIDTH] IN BLOOD BY AUTOMATED COUNT: 39.9 FL (ref 35.9–50)
HCT VFR BLD AUTO: 30.5 % (ref 37–47)
HGB BLD-MCNC: 9.7 G/DL (ref 12–16)
MCH RBC QN AUTO: 23.7 PG (ref 27–33)
MCHC RBC AUTO-ENTMCNC: 31.8 G/DL (ref 33.6–35)
MCV RBC AUTO: 74.6 FL (ref 81.4–97.8)
PLATELET # BLD AUTO: 286 K/UL (ref 164–446)
PMV BLD AUTO: 9.5 FL (ref 9–12.9)
RBC # BLD AUTO: 4.09 M/UL (ref 4.2–5.4)
WBC # BLD AUTO: 13.2 K/UL (ref 4.8–10.8)

## 2017-05-28 PROCEDURE — 85027 COMPLETE CBC AUTOMATED: CPT

## 2017-05-28 PROCEDURE — 36415 COLL VENOUS BLD VENIPUNCTURE: CPT

## 2017-05-28 PROCEDURE — 700111 HCHG RX REV CODE 636 W/ 250 OVERRIDE (IP): Performed by: FAMILY MEDICINE

## 2017-05-28 PROCEDURE — 700102 HCHG RX REV CODE 250 W/ 637 OVERRIDE(OP): Performed by: NURSE PRACTITIONER

## 2017-05-28 PROCEDURE — A9270 NON-COVERED ITEM OR SERVICE: HCPCS | Performed by: FAMILY MEDICINE

## 2017-05-28 PROCEDURE — 770002 HCHG ROOM/CARE - OB PRIVATE (112)

## 2017-05-28 PROCEDURE — A9270 NON-COVERED ITEM OR SERVICE: HCPCS | Performed by: PHYSICIAN ASSISTANT

## 2017-05-28 PROCEDURE — 304965 HCHG RECOVERY SERVICES

## 2017-05-28 PROCEDURE — 700102 HCHG RX REV CODE 250 W/ 637 OVERRIDE(OP): Performed by: FAMILY MEDICINE

## 2017-05-28 PROCEDURE — 303615 HCHG EPIDURAL/SPINAL ANESTHESIA FOR LABOR

## 2017-05-28 PROCEDURE — A9270 NON-COVERED ITEM OR SERVICE: HCPCS | Performed by: NURSE PRACTITIONER

## 2017-05-28 PROCEDURE — 700112 HCHG RX REV CODE 229: Performed by: NURSE PRACTITIONER

## 2017-05-28 PROCEDURE — 700102 HCHG RX REV CODE 250 W/ 637 OVERRIDE(OP): Performed by: PHYSICIAN ASSISTANT

## 2017-05-28 PROCEDURE — 59409 OBSTETRICAL CARE: CPT

## 2017-05-28 RX ORDER — HYDROCODONE BITARTRATE AND ACETAMINOPHEN 5; 325 MG/1; MG/1
1 TABLET ORAL EVERY 4 HOURS PRN
Status: DISCONTINUED | OUTPATIENT
Start: 2017-05-28 | End: 2017-05-29 | Stop reason: HOSPADM

## 2017-05-28 RX ORDER — SODIUM CHLORIDE, SODIUM LACTATE, POTASSIUM CHLORIDE, CALCIUM CHLORIDE 600; 310; 30; 20 MG/100ML; MG/100ML; MG/100ML; MG/100ML
INJECTION, SOLUTION INTRAVENOUS PRN
Status: DISCONTINUED | OUTPATIENT
Start: 2017-05-28 | End: 2017-05-29 | Stop reason: HOSPADM

## 2017-05-28 RX ORDER — IBUPROFEN 600 MG/1
600 TABLET ORAL EVERY 6 HOURS PRN
Status: DISCONTINUED | OUTPATIENT
Start: 2017-05-28 | End: 2017-05-29 | Stop reason: HOSPADM

## 2017-05-28 RX ORDER — FERROUS SULFATE 325(65) MG
325 TABLET ORAL
Status: DISCONTINUED | OUTPATIENT
Start: 2017-05-28 | End: 2017-05-29 | Stop reason: HOSPADM

## 2017-05-28 RX ORDER — OXYCODONE AND ACETAMINOPHEN 10; 325 MG/1; MG/1
1 TABLET ORAL EVERY 4 HOURS PRN
Status: DISCONTINUED | OUTPATIENT
Start: 2017-05-28 | End: 2017-05-29 | Stop reason: HOSPADM

## 2017-05-28 RX ORDER — MISOPROSTOL 200 UG/1
800 TABLET ORAL
Status: DISCONTINUED | OUTPATIENT
Start: 2017-05-28 | End: 2017-05-29 | Stop reason: HOSPADM

## 2017-05-28 RX ORDER — VITAMIN A ACETATE, BETA CAROTENE, ASCORBIC ACID, CHOLECALCIFEROL, .ALPHA.-TOCOPHEROL ACETATE, DL-, THIAMINE MONONITRATE, RIBOFLAVIN, NIACINAMIDE, PYRIDOXINE HYDROCHLORIDE, FOLIC ACID, CYANOCOBALAMIN, CALCIUM CARBONATE, FERROUS FUMARATE, ZINC OXIDE, CUPRIC OXIDE 3080; 12; 120; 400; 1; 1.84; 3; 20; 22; 920; 25; 200; 27; 10; 2 [IU]/1; UG/1; MG/1; [IU]/1; MG/1; MG/1; MG/1; MG/1; MG/1; [IU]/1; MG/1; MG/1; MG/1; MG/1; MG/1
1 TABLET, FILM COATED ORAL EVERY MORNING
Status: DISCONTINUED | OUTPATIENT
Start: 2017-05-28 | End: 2017-05-29 | Stop reason: HOSPADM

## 2017-05-28 RX ORDER — METHYLERGONOVINE MALEATE 0.2 MG/ML
0.2 INJECTION INTRAVENOUS
Status: DISCONTINUED | OUTPATIENT
Start: 2017-05-28 | End: 2017-05-29 | Stop reason: HOSPADM

## 2017-05-28 RX ORDER — DOCUSATE SODIUM 100 MG/1
100 CAPSULE, LIQUID FILLED ORAL 2 TIMES DAILY PRN
Status: DISCONTINUED | OUTPATIENT
Start: 2017-05-28 | End: 2017-05-29 | Stop reason: HOSPADM

## 2017-05-28 RX ADMIN — Medication 125 ML/HR: at 00:44

## 2017-05-28 RX ADMIN — Medication 1 TABLET: at 07:14

## 2017-05-28 RX ADMIN — DOCUSATE SODIUM 100 MG: 100 CAPSULE ORAL at 07:14

## 2017-05-28 RX ADMIN — IBUPROFEN 600 MG: 600 TABLET, FILM COATED ORAL at 15:05

## 2017-05-28 RX ADMIN — IBUPROFEN 600 MG: 600 TABLET, FILM COATED ORAL at 21:33

## 2017-05-28 RX ADMIN — HYDROCODONE BITARTRATE AND ACETAMINOPHEN 1 TABLET: 5; 325 TABLET ORAL at 00:47

## 2017-05-28 RX ADMIN — HYDROCODONE BITARTRATE AND ACETAMINOPHEN 1 TABLET: 5; 325 TABLET ORAL at 15:07

## 2017-05-28 RX ADMIN — IBUPROFEN 600 MG: 600 TABLET, FILM COATED ORAL at 06:52

## 2017-05-28 RX ADMIN — HYDROCODONE BITARTRATE AND ACETAMINOPHEN 1 TABLET: 5; 325 TABLET ORAL at 21:33

## 2017-05-28 RX ADMIN — Medication 325 MG: at 07:14

## 2017-05-28 ASSESSMENT — PAIN SCALES - GENERAL
PAINLEVEL_OUTOF10: 4
PAINLEVEL_OUTOF10: 2
PAINLEVEL_OUTOF10: 6
PAINLEVEL_OUTOF10: 2
PAINLEVEL_OUTOF10: 2
PAINLEVEL_OUTOF10: 6
PAINLEVEL_OUTOF10: 2

## 2017-05-28 NOTE — PROGRESS NOTES
"Mother states, BF going well  previous baby. C/o sore nipples, breasts assessed no cracks/break down noted. Discussed deep latch and provided \"Breastfeeding Essentials\" pamphlet with review of latch & baby wide open mouth. BF POC,  Q 2-3 hours with deep latch. Encouraged mother to use expressed colostrum/milk on nipples and air dry for any break down.  "

## 2017-05-28 NOTE — PROGRESS NOTES
Assessment done vital signs stable. Patient progressing according to plan of care. Fundus firm at the umbilicus with light lochia. Patient up voiding without difficulty. Claims to have good pain relief with p.o medications. Breast feeding infant on demand. Patient denies problems at this time. Patient encouraged to call for any needs. Pt claims she will call when medications are needed. Pt encouraged to call before getting out of bed

## 2017-05-28 NOTE — PROGRESS NOTES
L&D Progress Note    Name:   Gissell Colon   Date/Time:  5/27/2017 8:22 PM  Gestational Age:  37w5d  Admit Date:   5/27/2017  Admitting Dx:   pregnancy  Indication for care in labor and delivery, antepartum    Subjective:  Uterine contractions: yes  Pain:    no  Complaints:   no   Headache: .  no  Blurred vision:  no   SOB:    no   Nausea/vomiting:  no  Epigastric pain:  no  Fetal movement:  normal  Vaginal bleeding: . no    Objective:   Filed Vitals:    05/27/17 1827 05/27/17 1828 05/27/17 1838 05/27/17 1848   BP:  103/57 101/51 107/58   Pulse: 83 73 83 75   Temp:       TempSrc:       Resp:       Height:       Weight:       SpO2: 98%        Fetal heart variability: moderate  Fetal Heart Rate decelerations: variable  Fetal Heart Rate accelerations: yes  Baseline FHR: 120 per minute  Uterine contractions: irregular, every 2-10 minutes  Cervical: 80 ;  Dilatation .6 ; Station negative1    Fetal position:   Cephalic  Membranes ruptured: yes  AROM:  no  Meconium: .  no    IUPC: .   no  FSE .   no    Meds:   Epidural : .  yes  Magnesium sulfate: . no  Pitocin: .  No, will start pit in 30-45 min if needed     Labs:  Recent Results (from the past 72 hour(s))   Hold Blood Bank Specimen (Not Tested)    Collection Time: 05/27/17  5:35 PM   Result Value Ref Range    Holding Tube - Bb DONE    CBC WITH DIFFERENTIAL    Collection Time: 05/27/17  5:35 PM   Result Value Ref Range    WBC 9.5 4.8 - 10.8 K/uL    RBC 3.99 (L) 4.20 - 5.40 M/uL    Hemoglobin 9.4 (L) 12.0 - 16.0 g/dL    Hematocrit 29.7 (L) 37.0 - 47.0 %    MCV 74.4 (L) 81.4 - 97.8 fL    MCH 23.6 (L) 27.0 - 33.0 pg    MCHC 31.6 (L) 33.6 - 35.0 g/dL    RDW 39.4 35.9 - 50.0 fL    Platelet Count 305 164 - 446 K/uL    MPV 9.6 9.0 - 12.9 fL    Neutrophils-Polys 69.70 44.00 - 72.00 %    Lymphocytes 22.20 22.00 - 41.00 %    Monocytes 7.20 0.00 - 13.40 %    Eosinophils 0.20 0.00 - 6.90 %    Basophils 0.30 0.00 - 1.80 %    Immature Granulocytes 0.40 0.00 - 0.90 %     Nucleated RBC 0.00 /100 WBC    Neutrophils (Absolute) 6.58 2.00 - 7.15 K/uL    Lymphs (Absolute) 2.10 1.00 - 4.80 K/uL    Monos (Absolute) 0.68 0.00 - 0.85 K/uL    Eos (Absolute) 0.02 0.00 - 0.51 K/uL    Baso (Absolute) 0.03 0.00 - 0.12 K/uL    Immature Granulocytes (abs) 0.04 0.00 - 0.11 K/uL    NRBC (Absolute) 0.00 K/uL         Assessment:   Gestational Age:   37w5d  Risk Factors:   None  Labor State:    Active phase labor.  Pregnancy Complications: None  Plan:    Continue present management    Patient Active Problem List    Diagnosis Date Noted   • Indication for care in labor and delivery, antepartum 2017   • Supervision of normal pregnancy in third trimester 2017   • Late prenatal care 2017     SROM at 2020 with clear fluid  Will start pit if needed  Anticipate     Veda Bell C.N.M.

## 2017-05-28 NOTE — PROGRESS NOTES
Post Partum Progress Note    Name:   Gissell Colon   Date/Time:  5/28/2017 - 7:00 AM  Chief Admitting Dx:  pregnancy  Indication for care in labor and delivery, antepartum  Delivery Type:  vaginal, spontaneous  Post-Op/Post Partum Days #:  1    Subjective:  Abdominal pain: no  Ambulating:   yes  Tolerating liquids:  yes  Tolerating food:  yes common adult  Flatus:   yes  BM:    no  Bleeding:   with a small amount of bleeding  Voiding:   yes  Dizziness:   no  Feeding:   breast    Filed Vitals:    05/27/17 2100 05/27/17 2200 05/27/17 2300 05/28/17 0200   BP: 116/68 117/60 112/68 108/58   Pulse: 80 81 74 80   Temp:       TempSrc:       Resp:       Height:       Weight:       SpO2:           Exam:  Breast: Tenderness no and Engorged no  Abdomen: Abdomen soft, non-tender. BS normal. No masses,  No organomegaly  Fundal Tenderness:  no  Fundus Firm: yes  Incision: none  Below umbilicus: yes  Perineum: perineum intact  Lochia: mild  Extremities: trace extremities, peripheral pulses and reflexes normal    Meds:  Current Facility-Administered Medications   Medication Dose   • ibuprofen (MOTRIN) tablet 600 mg  600 mg   • hydrocodone-acetaminophen (NORCO) 5-325 MG per tablet 1 Tab  1 Tab   • oxycodone-acetaminophen (PERCOCET-10)  MG per tablet 1 Tab  1 Tab   • LR infusion     • PRN oxytocin (PITOCIN) (20 Units/1000 mL) PRN for excessive uterine bleeding - See Admin Instr  125-999 mL/hr   • misoprostol (CYTOTEC) tablet 800 mcg  800 mcg   • methylergonovine (METHERGINE) injection 0.2 mg  0.2 mg   • docusate sodium (COLACE) capsule 100 mg  100 mg   • prenatal plus vitamin (STUARTNATAL 1+1) 27-1 MG tablet 1 Tab  1 Tab   • oxytocin (PITOCIN) infusion (for postpartum)   mL/hr   • ondansetron (ZOFRAN ODT) dispertab 4 mg  4 mg    Or   • ondansetron (ZOFRAN) syringe/vial injection 4 mg  4 mg   • metoclopramide (REGLAN) injection 10 mg  10 mg       Labs:   Recent Labs      05/27/17   1735   WBC  9.5   RBC  3.99*    HEMOGLOBIN  9.4*   HEMATOCRIT  29.7*   MCV  74.4*   MCH  23.6*   MCHC  31.6*   RDW  39.4   PLATELETCT  305   MPV  9.6       Assessment:  Chief Admitting Dx:  pregnancy  Indication for care in labor and delivery, antepartum  Delivery Type:  vaginal, spontaneous  Tubal Ligation:  no    Plan:  Continue routine post partum care. Advance care, start PO FeSO4 for asymptomatic anemia, pain control, anticipate d/c home tomorrow, PPD#2 as pt had late delivery.     KELLY Thapa.

## 2017-05-28 NOTE — PROGRESS NOTES
0300 Assumed care from labor and delivery. Oriented patient to room, call light, emergency light, bed remote, TV. POC discussed, verbalized understanding. Patient will call if pain meds needed. Assessment completed, fundus firm, lochia light.

## 2017-05-28 NOTE — DELIVERY NOTE
Vaginal Delivery Note    Gissell Colon is a  3, now para 4, who presented in active phase labor at 37w5d.  Patient progressed in active labor with pitocin augmentation/induction to cervical exam 100%; cervical dilation 10; station +2 to complete the first stage of labor.  Patient then progressed through second stage and delivered spontaneously at 2313 a viable male infant over an intact perineum.  Nuchal cord present.  Infant Apgar 9 and 9, and weight 2755 grams.    During the third stage the placenta was delivered spontaneously at 2330 and was intact with 3 vessels    Assisted extraction:  none    Perineum repair:  none    Analgesia: n/a    Epidural:  yes    Family support:  yes    Infant bonding:  excellent    Estimated blood loss:  250 mL    Sponge count correct:  N\A    Patient tolerated the procedure:  Excellent    Veda Bell CNM    Delivery was attended by Tony HILARIO who was present for the entire delivery.    Dr Wade is the attending MD today

## 2017-05-28 NOTE — PROGRESS NOTES
1900 Report received from Angely MOREAU.  POC discussed.  Pt denies any questions at this time.     2340 Veda Ray notified of moderate bleeding with quarter sized clots.  Place 800 of cytotec.       0240 Pt up to bathroom.  Able to void sufficient amount.  Lizzie care completed and gown changed.      6035 Report to Valerie MOREAU.  POC discussed.  Pt denies any questions at this time.

## 2017-05-28 NOTE — CARE PLAN
Problem: Altered physiologic condition related to immediate post-delivery state and potential for bleeding/hemorrhage  Goal: Patient physiologically stable as evidenced by normal lochia, palpable uterine involution and vital signs within normal limits  Outcome: PROGRESSING AS EXPECTED  Fundus firm, lochia light    Problem: Alteration in comfort related to episiotomy, vaginal repair and/or after birth pains  Goal: Patient verbalizes acceptable pain level  Outcome: PROGRESSING AS EXPECTED  Patient indicated acceptable pain level at this time

## 2017-05-28 NOTE — PROGRESS NOTES
1735-Pt requesting an epidural for pain management, IV started, labs drawn, LR bolus started.  1740- Report received from CHANDU Layne RN.   Pt moved to  222.  1810- Dr. Pearson at the bedside, timeout complete, epidural placed.  1900- Report given to ASHLEY Mckeon RN.  1910- SVE 5-6/80/-1, ASHLEY Coley RN.

## 2017-05-28 NOTE — CARE PLAN
Problem: Potential for postpartum infection related to presence of episiotomy/vaginal tear and/or uterine contamination  Goal: Patient will be absent from signs and symptoms of infection  Outcome: PROGRESSING AS EXPECTED  No signs of infection noted    Problem: Potential knowledge deficit related to lack of understanding of self and  care  Goal: Patient will verbalize understanding of self and infant care  Outcome: PROGRESSING AS EXPECTED  Post partum teaching complete

## 2017-05-29 VITALS
RESPIRATION RATE: 18 BRPM | BODY MASS INDEX: 24.84 KG/M2 | HEART RATE: 64 BPM | OXYGEN SATURATION: 99 % | HEIGHT: 62 IN | DIASTOLIC BLOOD PRESSURE: 72 MMHG | SYSTOLIC BLOOD PRESSURE: 112 MMHG | TEMPERATURE: 96.9 F | WEIGHT: 135 LBS

## 2017-05-29 PROBLEM — Z34.93 SUPERVISION OF NORMAL PREGNANCY IN THIRD TRIMESTER: Status: RESOLVED | Noted: 2017-04-18 | Resolved: 2017-05-29

## 2017-05-29 PROBLEM — O09.30 LATE PRENATAL CARE: Status: RESOLVED | Noted: 2017-04-18 | Resolved: 2017-05-29

## 2017-05-29 PROCEDURE — A9270 NON-COVERED ITEM OR SERVICE: HCPCS | Performed by: PHYSICIAN ASSISTANT

## 2017-05-29 PROCEDURE — 700102 HCHG RX REV CODE 250 W/ 637 OVERRIDE(OP): Performed by: NURSE PRACTITIONER

## 2017-05-29 PROCEDURE — 700102 HCHG RX REV CODE 250 W/ 637 OVERRIDE(OP): Performed by: FAMILY MEDICINE

## 2017-05-29 PROCEDURE — A9270 NON-COVERED ITEM OR SERVICE: HCPCS | Performed by: NURSE PRACTITIONER

## 2017-05-29 PROCEDURE — A9270 NON-COVERED ITEM OR SERVICE: HCPCS | Performed by: FAMILY MEDICINE

## 2017-05-29 PROCEDURE — 700102 HCHG RX REV CODE 250 W/ 637 OVERRIDE(OP): Performed by: PHYSICIAN ASSISTANT

## 2017-05-29 RX ORDER — FERROUS SULFATE 325(65) MG
325 TABLET ORAL
Qty: 30 TAB | Refills: 2 | Status: SHIPPED | OUTPATIENT
Start: 2017-05-29 | End: 2021-01-27

## 2017-05-29 RX ORDER — IBUPROFEN 600 MG/1
600 TABLET ORAL EVERY 6 HOURS PRN
Qty: 30 TAB | Refills: 2 | Status: SHIPPED | OUTPATIENT
Start: 2017-05-29 | End: 2021-01-27

## 2017-05-29 RX ADMIN — Medication 1 TABLET: at 08:05

## 2017-05-29 RX ADMIN — HYDROCODONE BITARTRATE AND ACETAMINOPHEN 1 TABLET: 5; 325 TABLET ORAL at 01:28

## 2017-05-29 RX ADMIN — IBUPROFEN 600 MG: 600 TABLET, FILM COATED ORAL at 08:05

## 2017-05-29 RX ADMIN — Medication 325 MG: at 08:05

## 2017-05-29 ASSESSMENT — PAIN SCALES - GENERAL
PAINLEVEL_OUTOF10: 2
PAINLEVEL_OUTOF10: 6
PAINLEVEL_OUTOF10: 2
PAINLEVEL_OUTOF10: 6

## 2017-05-29 NOTE — PROGRESS NOTES
Assumed care. Assessment completed. Fundus firm, lochia light. POC discussed, verbalized understanding. This RN will assess pain when pain med due.

## 2017-05-29 NOTE — DISCHARGE SUMMARY
Discharge Summary:      Gissell Colon      Admit Date:   2017  Discharge Date:  2017     Admitting diagnosis:  pregnancy  Indication for care in labor and delivery, antepartum  Discharge Diagnosis: Status post vaginal, spontaneous.  Pregnancy Complications: none  Tubal Ligation:  no        History:  Past Medical History   Diagnosis Date   • N&V - nausea and vomiting 2011   • UTI (urinary tract infection)      admitted for 6 days early      OB History    Para Term  AB SAB TAB Ectopic Multiple Living   4 3 3 0      3      # Outcome Date GA Lbr José Luis/2nd Weight Sex Delivery Anes PTL Lv   4 Current            3 Term 16 39w5d  2.268 kg (5 lb) F Vag-Spont  N Y      Comments: No complications   2 Term 13 38w1d  2.835 kg (6 lb 4 oz) M Vag-Spont EPI  Y      Comments: System Generated. Please review and update pregnancy details.   1 Term 12 39w4d  3.118 kg (6 lb 14 oz) M Vag-Vacuum EPI N Y      Comments: Pt states no complications           Nyquil  Patient Active Problem List    Diagnosis Date Noted   • Indication for care in labor and delivery, antepartum 2017        Hospital Course:   21 y.o. , now para 4, was admitted with the above mentioned diagnosis, underwent Active Labor, vaginal, spontaneous. Patient postpartum course was unremarkable, with progressive advancement in diet , ambulation and toleration of oral analgesia. Patient without complaints today and desires discharge.      Filed Vitals:    17 2300 17 0200 17 0800 17 2000   BP: 112/68 108/58 104/69 102/67   Pulse: 74 80 90 75   Temp:   37.4 °C (99.4 °F) 36.5 °C (97.7 °F)   TempSrc:       Resp:   18 18   Height:       Weight:       SpO2:   99% 100%       Current Facility-Administered Medications   Medication Dose   • ibuprofen (MOTRIN) tablet 600 mg  600 mg   • hydrocodone-acetaminophen (NORCO) 5-325 MG per tablet 1 Tab  1 Tab   • oxycodone-acetaminophen (PERCOCET-10)   MG per tablet 1 Tab  1 Tab   • LR infusion     • PRN oxytocin (PITOCIN) (20 Units/1000 mL) PRN for excessive uterine bleeding - See Admin Instr  125-999 mL/hr   • misoprostol (CYTOTEC) tablet 800 mcg  800 mcg   • methylergonovine (METHERGINE) injection 0.2 mg  0.2 mg   • docusate sodium (COLACE) capsule 100 mg  100 mg   • prenatal plus vitamin (STUARTNATAL 1+1) 27-1 MG tablet 1 Tab  1 Tab   • ferrous sulfate tablet 325 mg  325 mg   • oxytocin (PITOCIN) infusion (for postpartum)   mL/hr   • ondansetron (ZOFRAN ODT) dispertab 4 mg  4 mg    Or   • ondansetron (ZOFRAN) syringe/vial injection 4 mg  4 mg   • metoclopramide (REGLAN) injection 10 mg  10 mg       Exam:  Breast Exam: negative  Abdomen: Abdomen soft, non-tender. BS normal. No masses,  No organomegaly  Fundus Non Tender: yes  Incision: none  Perineum: perineum intact  Extremity: extremities, peripheral pulses and reflexes normal, no edema, redness or tenderness in the calves or thighs     Labs:  Recent Labs      05/27/17   1735  05/28/17   0804   WBC  9.5  13.2*   RBC  3.99*  4.09*   HEMOGLOBIN  9.4*  9.7*   HEMATOCRIT  29.7*  30.5*   MCV  74.4*  74.6*   MCH  23.6*  23.7*   MCHC  31.6*  31.8*   RDW  39.4  39.9   PLATELETCT  305  286   MPV  9.6  9.5        Activity:   Discharge to home  Pelvic Rest x 6 weeks    Assessment:  normal postpartum course  Discharge Assessment: No areas of skin breakdown/redness; surgical incision intact/healing     [facility]: [facility] or [facility] in 5 weeks for vaginal ; 1 week for incision check.      Discharge Meds:   Current Outpatient Prescriptions   Medication Sig Dispense Refill   • ibuprofen (MOTRIN) 600 MG Tab Take 1 Tab by mouth every 6 hours as needed (For cramping after delivery; do not give if patient is receiving ketorolac (Toradol)). 30 Tab 2   • ferrous sulfate 325 (65 FE) MG tablet Take 1 Tab by mouth every morning with breakfast. 30 Tab 2       Mell Junior [email or web address]

## 2017-05-29 NOTE — DISCHARGE INSTRUCTIONS
POSTPARTUM DISCHARGE INSTRUCTIONS FOR MOM    YOB: 1995   Age: 21 y.o.               Admit Date: 5/27/2017     Discharge Date: 5/29/2017  Attending Doctor:  Jasiel Wade M.D.                  Allergies:  Nyquil    Discharged to home by car. Discharged via wheelchair, hospital escort: Yes.  Special equipment needed: Not Applicable  Belongings with: Personal  Be sure to schedule a follow-up appointment with your primary care doctor or any specialists as instructed.     Discharge Plan:   Diet Plan: Discussed  Activity Level: Discussed  Confirmed Follow up Appointment: Patient to Call and Schedule Appointment  Confirmed Symptoms Management: Discussed  Medication Reconciliation Updated: Yes  Influenza Vaccine Indication: Not indicated: Previously immunized this influenza season and > 8 years of age    REASONS TO CALL YOUR OBSTETRICIAN:  1.   Persistent fever or shaking chills (Temperature higher than 100.4)  2.   Heavy bleeding (soaking more than 1 pad per hour); Passing clots  3.   Foul odor from vagina  4.   Mastitis (Breast infection; breast pain, chills, fever, redness)  5.   Urinary pain, burning or frequency  6.   Episiotomy infection  7.   Severe depression longer than 24 hours    HAND WASHING  · Prior to handling the baby.  · Before breastfeeding or bottle feeding baby.  · After using the bathroom or changing the baby's diaper.    VAGINAL CARE  · Nothing inside vagina for 6 weeks: no sexual intercourse, tampons or douching.  · Bleeding may continue for 2-4 weeks.  Amount may vary.    · Call your physician for heavy bleeding which means soaking more than 1 pad per hour    BIRTH CONTROL  · It is possible to become pregnant at any time after delivery and while breastfeeding.  · Plan to discuss a method of birth control with your physician at your follow up visit. visit.    DIET AND ELIMINATION  · Eating more fiber (bran cereal, fruits, and vegetables) and drinking plenty of fluids will help to avoid  "constipation.  · Urinary frequency after childbirth is normal.    POSTPARTUM BLUES  During the first few days after birth, you may experience a sense of the \"blues\" which may include impatience, irritability or even crying.  These feeling come and go quickly.  However, as many as 1 in 10 women experience emotional symptoms known as postpartum depression.    Postpartum depression:  May start as early as the second or third day after delivery or take several weeks or months to develop.  Symptoms of \"blues\" are present, but are more intense:  Crying spells; loss of appetite; feelings of hopelessness or loss of control; fear of touching the baby; over concern or no concern at all about the baby; little or no concern about your own appearance/caring for yourself; and/or inability to sleep or excessive sleeping.  Contact your physician if you are experiencing any of these symptoms.    Crisis Hotline:  · [place] Crisis Hotline:  1-932-YCQRJUU  Or 1-693.736.5335  · [place] Crisis Hotline:  1-611.150.6733  Or 800-422-6408    PREVENTING SHAKEN BABY:  If you are angry or stressed, PUT THE BABY IN THE CRIB, step away, take some deep breaths, and wait until you are calm to care for the baby.  DO NOT SHAKE THE BABY.  You are not alone, call a supporter for help.    · Crisis Call Center 24/7 crisis line 218-043-9965 or 1-555.268.7070  · You can also text them, text \"ANSWER\" to 691273    QUIT SMOKING/TOBACCO USE:  I understand the use of any tobacco products increases my chance of suffering from future heart disease and could cause other illnesses which may shorten my life. Quitting the use of tobacco products is the single most important thing I can do to improve my health. For further information on smoking / tobacco cessation call a Toll Free Quit Line at 1-499.544.3179 (*[employer] [place]) or 1-444.725.5106 (American Lung Association) or you can access the web based program at www.lungusa.org.    · [place] Tobacco Users " Help Line:  (814) 482-4176       Toll Free: 1-554.524.6972  · Quit Tobacco Program [facility] (214)481-2896    DEPRESSION / SUICIDE RISK:  As you are discharged from this [facility], it is important to learn how to keep safe from harming yourself.    Recognize the warning signs:  · Abrupt changes in personality, positive or negative- including increase in energy   · Giving away possessions  · Change in eating patterns- significant weight changes-  positive or negative  · Change in sleeping patterns- unable to sleep or sleeping all the time   · Unwillingness or inability to communicate  · Depression  · Unusual sadness, discouragement and loneliness  · Talk of wanting to die  · Neglect of personal appearance   · Rebelliousness- reckless behavior  · Withdrawal from people/activities they love  · Confusion- inability to concentrate     If you or a loved one observes any of these behaviors or has concerns about self-harm, here's what you can do:  · Talk about it- your feelings and reasons for harming yourself  · Remove any means that you might use to hurt yourself (examples: pills, rope, extension cords, firearm)  · Get professional help from the community (Mental Health, Substance Abuse, psychological counseling)  · Do not be alone:Call your Safe Contact- someone whom you trust who will be there for you.  · Call your local CRISIS HOTLINE 652-4126 or 008-423-9922  · Call your local Children's Mobile Crisis Response Team [place] (002) 208-4815 or www.Shop2  · Call the toll free National Suicide Prevention Hotlines   · National Suicide Prevention Lifeline 417-787-SKVA (7281)  · National Hope Line Network 800-SUICIDE (707-2300)    DISCHARGE SURVEY:  Thank you for choosing [facility].  We hope we provided you with very good care.  You may be receiving a survey in the mail.  Please fill it out.  Your opinion is valuable to us.    ADDITIONAL EDUCATIONAL MATERIALS GIVEN TO  PATIENT:        My signature on this form indicates that:  1.  I have reviewed and understand the above information  2.  My questions regarding this information have been answered to my satisfaction.  3.  I have formulated a plan with my discharge nurse to obtain my prescribed medication for home.

## 2017-05-29 NOTE — PROGRESS NOTES
Assumed care of pt at 0700, pt c/o 6/10 pain in abd, PRN motrin administered and working well at this time. Discussed POC with pt and answered all questions and concerns at this time. Fundus firm, lochia light. Infant latching well and eating frequently. Hourly rounding in place.

## 2017-05-29 NOTE — CARE PLAN
Problem: Altered physiologic condition related to immediate post-delivery state and potential for bleeding/hemorrhage  Goal: Patient physiologically stable as evidenced by normal lochia, palpable uterine involution and vital signs within normal limits  Outcome: PROGRESSING AS EXPECTED  Fundus firm, lochia light     Problem: Alteration in comfort related to episiotomy, vaginal repair and/or after birth pains  Goal: Patient is able to ambulate, care for self and infant  Outcome: PROGRESSING AS EXPECTED  Patient ambulating, caring for self and infant

## 2017-05-29 NOTE — CARE PLAN
Problem: Safety  Goal: Will remain free from injury  Outcome: PROGRESSING AS EXPECTED  Infant safety education provided to pt.    Problem: Knowledge Deficit  Goal: Knowledge of disease process/condition, treatment plan, diagnostic tests, and medications will improve  Outcome: PROGRESSING AS EXPECTED  POC discussed, all questions/concerns addressed at this time.

## 2017-06-30 ENCOUNTER — POST PARTUM (OUTPATIENT)
Dept: OBGYN | Facility: CLINIC | Age: 22
End: 2017-06-30

## 2017-06-30 VITALS — SYSTOLIC BLOOD PRESSURE: 100 MMHG | WEIGHT: 117 LBS | BODY MASS INDEX: 21.39 KG/M2 | DIASTOLIC BLOOD PRESSURE: 64 MMHG

## 2017-06-30 DIAGNOSIS — N76.0 BV (BACTERIAL VAGINOSIS): ICD-10-CM

## 2017-06-30 DIAGNOSIS — Z30.011 ORAL CONTRACEPTION INITIATION: ICD-10-CM

## 2017-06-30 DIAGNOSIS — B96.89 BV (BACTERIAL VAGINOSIS): ICD-10-CM

## 2017-06-30 PROCEDURE — 90050 PR POSTPARTUM VISIT: CPT | Performed by: NURSE PRACTITIONER

## 2017-06-30 RX ORDER — METRONIDAZOLE 500 MG/1
500 TABLET ORAL 2 TIMES DAILY
Qty: 20 TAB | Refills: 0 | Status: SHIPPED | OUTPATIENT
Start: 2017-06-30 | End: 2021-01-27

## 2017-06-30 RX ORDER — LEVONORGESTREL AND ETHINYL ESTRADIOL 0.1-0.02MG
1 KIT ORAL DAILY
Qty: 28 TAB | Refills: 3 | Status: SHIPPED | OUTPATIENT
Start: 2017-06-30 | End: 2021-01-27

## 2017-06-30 RX ORDER — METRONIDAZOLE 500 MG/1
500 TABLET ORAL 2 TIMES DAILY
Qty: 20 TAB | Refills: 0 | Status: SHIPPED | OUTPATIENT
Start: 2017-06-30 | End: 2017-06-30

## 2017-06-30 RX ORDER — DOXYCYCLINE HYCLATE 100 MG/1
100 CAPSULE ORAL 2 TIMES DAILY
Qty: 20 CAP | Refills: 0 | Status: SHIPPED | OUTPATIENT
Start: 2017-06-30 | End: 2021-01-27

## 2017-06-30 ASSESSMENT — ENCOUNTER SYMPTOMS
GASTROINTESTINAL NEGATIVE: 1
CONSTITUTIONAL NEGATIVE: 1
NEUROLOGICAL NEGATIVE: 1
RESPIRATORY NEGATIVE: 1
EYES NEGATIVE: 1
CARDIOVASCULAR NEGATIVE: 1
MUSCULOSKELETAL NEGATIVE: 1
PSYCHIATRIC NEGATIVE: 1

## 2017-06-30 NOTE — PROGRESS NOTES
Pt here today for postpartum exam.  Delivery Date 5/27/17   Operation Date: N/A  Currently: Formula  BCM: nexplanon, [facility].   Wt: 117lb  BP: 100/64  LMP: not yet  Good ph:[phone]

## 2017-06-30 NOTE — PROGRESS NOTES
Subjective:      Gissell Colon is a 21 y.o. female who presents with No chief complaint on file.            HPI    Review of Systems   Constitutional: Negative.    HENT: Negative.    Eyes: Negative.    Respiratory: Negative.    Cardiovascular: Negative.    Gastrointestinal: Negative.    Genitourinary: Negative.    Musculoskeletal: Negative.    Neurological: Negative.    Endo/Heme/Allergies: Negative.    Psychiatric/Behavioral: Negative.           Objective:     /64 mmHg  Wt 53.071 kg (117 lb)  Breastfeeding? Unknown     Physical Exam   Constitutional: She is oriented to person, place, and time. She appears well-developed and well-nourished.   HENT:   Head: Normocephalic and atraumatic.   Neck: Normal range of motion. Neck supple.   Cardiovascular: Normal rate, regular rhythm and normal heart sounds.    Pulmonary/Chest: Effort normal and breath sounds normal. Right breast exhibits no inverted nipple and no mass. Left breast exhibits no inverted nipple and no mass. Breasts are symmetrical. There is no breast swelling.   Abdominal: Soft. Bowel sounds are normal. Hernia confirmed negative in the right inguinal area and confirmed negative in the left inguinal area.   Genitourinary: No breast tenderness, discharge or bleeding. Pelvic exam was performed with patient supine. No labial fusion. There is no rash, tenderness, lesion or injury on the right labia. There is no rash, tenderness, lesion or injury on the left labia. Uterus is not tender. Cervix exhibits discharge. Cervix exhibits no motion tenderness. Right adnexum displays no mass and no tenderness. Left adnexum displays no mass and no tenderness. There is tenderness in the vagina. Vaginal discharge found.   Painful cramping, copious yellow frothy d/c noted   Wet mount TMTC WBC's, and + CC + Whiff    Lymphadenopathy:        Right: No inguinal adenopathy present.        Left: No inguinal adenopathy present.   Neurological: She is alert and oriented to  person, place, and time. She has normal reflexes.   Skin: Skin is warm and dry.               Assessment/Plan:

## 2017-06-30 NOTE — PROGRESS NOTES
Subjective   Subjective:    Gissell Colon is a 21 y.o. female who presents for her postpartum exam 5 weeks following . Her prenatal course was complicated by late PNC . Her postpartum course was complicated by late PNC. She denies dysuria, vaginal bleeding, odor, itching or breast problems. She is NOT BREAST Feeding. She desires a Nexplanon  for her birth control method, no insurance so encouraged to make apt at [facility] or [facility]. Reports NO sex prior to this appointment. Eating a regular diet without difficulty. Bowel movement are Normal.  Pt reports cramping and vaginal discomfort, pain goes down both her legs, Spotting continues at times. . Patient denies postpartum depression.      Problem List     There are no active problems to display for this patient.      Objective    See PE  Lab:No results found for this or any previous visit (from the past 336 hour(s)).  @weight  @vitals    Assessment   Assessment:    1. PP care   2. Exam reveals endometritis   3. Pap WNL 2017   4. Desires contraception  Nexplanon     Plan   Plan:    1. Doxycycline 100 mg PO Bid x 10 days  Metronidazole 500 mg PO bid x 10 days for the endometritis and BV   2. Continue PNV   3. Contraceptive counseling - follow up w health dept or [facility] for [facility]  4. Encouraged condom use for the first 7 days of your pills or method, BUT NO SEX FOR 10 days   5. Discussed diet, exercise and resumption of sexual activity   6. Preconception guidance for next pregnancy if applicable. Folic acid for all women of childbearing age.   7.  Follow up needed 10 days to re assess for Endometritis  8.  Smoking/etoh/drug screening Negative per patient   9. Script for OCPs at the Pharmacy to start next week until she can get in to the clinic for the Nexplanon.  States has taken OCP's before Risks and Benefits and instructions are given

## 2017-06-30 NOTE — MR AVS SNAPSHOT
Gissell Ottona   2017 2:30 PM   Post Partum   MRN: [id number]    [facility]   Dept Phone:  [phone]    Description:  Female : 1995   Provider:  RG Esquivel           Allergies as of 2017     Allergen Noted Reactions    Nyquil 2011   Swelling      You were diagnosed with     Postpartum care and examination   [1663359]       BV (bacterial vaginosis)   [[id number]]       Endometritis following delivery   [[id number]]       Oral contraception initiation   [[id number]]         Vital Signs     Blood Pressure Weight Breastfeeding? Smoking Status          100/64 mmHg 53.071 kg (117 lb) Unknown Never Smoker         Basic Information     Date Of Birth Sex Race Ethnicity Preferred Language    1995 Female White  Origin (Ghanaian,Burkinan,South African,Tate, etc) English      Your appointments     Jul 10, 2017  2:00 PM   Post Partum with ASHLEY Thapa   [facility] [address]  [facility] [address] [phone]              Health Maintenance        Date Due Completion Dates    IMM HEP B VACCINE (1 of 3 - Primary Series) 1994 ---    IMM HEP A VACCINE (1 of 2 - Standard Series) 1995 ---    IMM HPV VACCINE (1 of 3 - Female 3 Dose Series) 2005 ---    IMM VARICELLA (CHICKENPOX) VACCINE (1 of 2 - 2 Dose Adolescent Series) 2007 ---    PAP SMEAR 4/3/2020 4/3/2017, 2011    IMM DTaP/Tdap/Td Vaccine (2 - Td) 2027            Current Immunizations     Influenza TIV (IM) 2012  4:45 PM    Tdap Vaccine 2017      Below and/or attached are the medications your provider expects you to take. Review all of your home medications and newly ordered medications with your provider and/or pharmacist. Follow medication instructions as directed by your provider and/or pharmacist. Please keep your medication list with you and share with your provider. Update the information when medications are  discontinued, doses are changed, or new medications (including over-the-counter products) are added; and carry medication information at all times in the event of emergency situations     Allergies:  NYQUIL - Swelling               Medications  Valid as of: June 30, 2017 -  3:16 PM    Generic Name Brand Name Tablet Size Instructions for use    Doxycycline Hyclate (Cap) VIBRAMYCIN 100 MG Take 1 Cap by mouth 2 times a day.        Ferrous Sulfate (Tab) ferrous sulfate 325 (65 FE) MG Take 1 Tab by mouth every morning with breakfast.        Ibuprofen (Tab) MOTRIN 600 MG Take 1 Tab by mouth every 6 hours as needed (For cramping after delivery; do not give if patient is receiving ketorolac (Toradol)).        Levonorgestrel-Ethinyl Estrad (Tab) AVIANE, ALESSE, LESSINA 0.1-20 MG-MCG Take 1 Tab by mouth every day.        MetroNIDAZOLE (Tab) FLAGYL 500 MG Take 1 Tab by mouth 2 Times a Day.        MetroNIDAZOLE (Tab) FLAGYL 500 MG Take 1 Tab by mouth 2 Times a Day.        Prenatal Multivit-Min-Fe-FA   Take  by mouth.        .                 Medicines prescribed today were sent to:     [facility] [address] [place] [address] - [address]     [address] [facility] [address]    Phone: [phone] Fax: [phone]    Open 24 Hours?: No      Medication refill instructions:       If your prescription bottle indicates you have medication refills left, it is not necessary to call your provider’s office. Please contact your pharmacy and they will refill your medication.    If your prescription bottle indicates you do not have any refills left, you may request refills at any time through one of the following ways: The online [employer] system (except Urgent Care), by calling your provider’s office, or by asking your pharmacy to contact your provider’s office with a refill request. Medication refills are processed only during regular business hours and may not be available until the next business day. Your provider may request additional  information or to have a follow-up visit with you prior to refilling your medication.   *Please Note: Medication refills are assigned a new Rx number when refilled electronically. Your pharmacy may indicate that no refills were authorized even though a new prescription for the same medication is available at the pharmacy. Please request the medicine by name with the pharmacy before contacting your provider for a refill.        Other Notes About Your Plan     Baby Boy           MyChart Access Code: Activation code not generated  Current MyChart Status: Active

## 2017-09-23 ENCOUNTER — HOSPITAL ENCOUNTER (EMERGENCY)
Dept: HOSPITAL 8 - ED | Age: 22
Discharge: HOME | End: 2017-09-23
Payer: SELF-PAY

## 2017-09-23 VITALS — HEIGHT: 62 IN | BODY MASS INDEX: 21.75 KG/M2 | WEIGHT: 118.17 LBS

## 2017-09-23 VITALS — DIASTOLIC BLOOD PRESSURE: 52 MMHG | SYSTOLIC BLOOD PRESSURE: 103 MMHG

## 2017-09-23 DIAGNOSIS — G43.001: Primary | ICD-10-CM

## 2017-09-23 LAB
AST SERPL-CCNC: 19 U/L (ref 15–37)
BUN SERPL-MCNC: 14 MG/DL (ref 7–18)
HCT VFR BLD CALC: 38.5 % (ref 34.6–47.8)
HGB BLD-MCNC: 12.9 G/DL (ref 11.7–16.4)
WBC # BLD AUTO: 8.2 X10^3/UL (ref 3.4–10)

## 2017-09-23 PROCEDURE — 96375 TX/PRO/DX INJ NEW DRUG ADDON: CPT

## 2017-09-23 PROCEDURE — 87186 SC STD MICRODIL/AGAR DIL: CPT

## 2017-09-23 PROCEDURE — 84703 CHORIONIC GONADOTROPIN ASSAY: CPT

## 2017-09-23 PROCEDURE — 81001 URINALYSIS AUTO W/SCOPE: CPT

## 2017-09-23 PROCEDURE — 96374 THER/PROPH/DIAG INJ IV PUSH: CPT

## 2017-09-23 PROCEDURE — 36415 COLL VENOUS BLD VENIPUNCTURE: CPT

## 2017-09-23 PROCEDURE — 85025 COMPLETE CBC W/AUTO DIFF WBC: CPT

## 2017-09-23 PROCEDURE — 87077 CULTURE AEROBIC IDENTIFY: CPT

## 2017-09-23 PROCEDURE — 70450 CT HEAD/BRAIN W/O DYE: CPT

## 2017-09-23 PROCEDURE — 80053 COMPREHEN METABOLIC PANEL: CPT

## 2017-09-23 PROCEDURE — 99285 EMERGENCY DEPT VISIT HI MDM: CPT

## 2017-09-23 PROCEDURE — 96361 HYDRATE IV INFUSION ADD-ON: CPT

## 2017-09-23 PROCEDURE — 87086 URINE CULTURE/COLONY COUNT: CPT

## 2017-09-23 PROCEDURE — 84443 ASSAY THYROID STIM HORMONE: CPT

## 2019-07-19 ENCOUNTER — HOSPITAL ENCOUNTER (INPATIENT)
Dept: HOSPITAL 8 - ED | Age: 24
LOS: 2 days | Discharge: HOME | DRG: 872 | End: 2019-07-21
Attending: INTERNAL MEDICINE | Admitting: INTERNAL MEDICINE
Payer: COMMERCIAL

## 2019-07-19 VITALS — HEIGHT: 62 IN | BODY MASS INDEX: 27.06 KG/M2 | WEIGHT: 147.05 LBS

## 2019-07-19 VITALS — DIASTOLIC BLOOD PRESSURE: 62 MMHG | SYSTOLIC BLOOD PRESSURE: 95 MMHG

## 2019-07-19 DIAGNOSIS — R65.20: ICD-10-CM

## 2019-07-19 DIAGNOSIS — Z83.3: ICD-10-CM

## 2019-07-19 DIAGNOSIS — N12: ICD-10-CM

## 2019-07-19 DIAGNOSIS — B96.20: ICD-10-CM

## 2019-07-19 DIAGNOSIS — J02.0: ICD-10-CM

## 2019-07-19 DIAGNOSIS — N83.201: ICD-10-CM

## 2019-07-19 DIAGNOSIS — A41.9: Primary | ICD-10-CM

## 2019-07-19 DIAGNOSIS — N92.6: ICD-10-CM

## 2019-07-19 LAB
ALBUMIN SERPL-MCNC: 4.2 G/DL (ref 3.4–5)
ALP SERPL-CCNC: 100 U/L (ref 45–117)
ALT SERPL-CCNC: 28 U/L (ref 12–78)
ANION GAP SERPL CALC-SCNC: 11 MMOL/L (ref 5–15)
BASOPHILS # BLD AUTO: 0.02 X10^3/UL (ref 0–0.1)
BASOPHILS NFR BLD AUTO: 0 % (ref 0–1)
BILIRUB SERPL-MCNC: 0.5 MG/DL (ref 0.2–1)
CALCIUM SERPL-MCNC: 9.1 MG/DL (ref 8.5–10.1)
CHLORIDE SERPL-SCNC: 105 MMOL/L (ref 98–107)
CREAT SERPL-MCNC: 0.82 MG/DL (ref 0.55–1.02)
CULTURE INDICATED?: YES
EOSINOPHIL # BLD AUTO: 0 X10^3/UL (ref 0–0.4)
EOSINOPHIL NFR BLD AUTO: 0 % (ref 1–7)
ERYTHROCYTE [DISTWIDTH] IN BLOOD BY AUTOMATED COUNT: 12.3 % (ref 9.6–15.2)
HCG UR SG: 1.02 (ref 1–1.03)
LYMPHOCYTES # BLD AUTO: 0.55 X10^3/UL (ref 1–3.4)
LYMPHOCYTES NFR BLD AUTO: 3 % (ref 22–44)
MCH RBC QN AUTO: 28.9 PG (ref 27–34.8)
MCHC RBC AUTO-ENTMCNC: 33.6 G/DL (ref 32.4–35.8)
MCV RBC AUTO: 86.2 FL (ref 80–100)
MD: (no result)
MICROSCOPIC: (no result)
MONOCYTES # BLD AUTO: 0.31 X10^3/UL (ref 0.2–0.8)
MONOCYTES NFR BLD AUTO: 2 % (ref 2–9)
NEUTROPHILS # BLD AUTO: 16.11 X10^3/UL (ref 1.8–6.8)
NEUTROPHILS NFR BLD AUTO: 95 % (ref 42–75)
PLATELET # BLD AUTO: 284 X10^3/UL (ref 130–400)
PMV BLD AUTO: 8.2 FL (ref 7.4–10.4)
PROT SERPL-MCNC: 8.7 G/DL (ref 6.4–8.2)
RBC # BLD AUTO: 5.02 X10^6/UL (ref 3.82–5.3)

## 2019-07-19 PROCEDURE — 87086 URINE CULTURE/COLONY COUNT: CPT

## 2019-07-19 PROCEDURE — 80048 BASIC METABOLIC PNL TOTAL CA: CPT

## 2019-07-19 PROCEDURE — 87077 CULTURE AEROBIC IDENTIFY: CPT

## 2019-07-19 PROCEDURE — 81025 URINE PREGNANCY TEST: CPT

## 2019-07-19 PROCEDURE — 71045 X-RAY EXAM CHEST 1 VIEW: CPT

## 2019-07-19 PROCEDURE — 83605 ASSAY OF LACTIC ACID: CPT

## 2019-07-19 PROCEDURE — 85025 COMPLETE CBC W/AUTO DIFF WBC: CPT

## 2019-07-19 PROCEDURE — 87040 BLOOD CULTURE FOR BACTERIA: CPT

## 2019-07-19 PROCEDURE — 80053 COMPREHEN METABOLIC PANEL: CPT

## 2019-07-19 PROCEDURE — 81001 URINALYSIS AUTO W/SCOPE: CPT

## 2019-07-19 PROCEDURE — 76830 TRANSVAGINAL US NON-OB: CPT

## 2019-07-19 PROCEDURE — 36415 COLL VENOUS BLD VENIPUNCTURE: CPT

## 2019-07-19 PROCEDURE — 99291 CRITICAL CARE FIRST HOUR: CPT

## 2019-07-19 PROCEDURE — 93005 ELECTROCARDIOGRAM TRACING: CPT

## 2019-07-19 PROCEDURE — 87186 SC STD MICRODIL/AGAR DIL: CPT

## 2019-07-19 PROCEDURE — 96375 TX/PRO/DX INJ NEW DRUG ADDON: CPT

## 2019-07-19 PROCEDURE — 84145 PROCALCITONIN (PCT): CPT

## 2019-07-19 PROCEDURE — 74177 CT ABD & PELVIS W/CONTRAST: CPT

## 2019-07-19 PROCEDURE — 87880 STREP A ASSAY W/OPTIC: CPT

## 2019-07-19 PROCEDURE — 96361 HYDRATE IV INFUSION ADD-ON: CPT

## 2019-07-19 PROCEDURE — 96365 THER/PROPH/DIAG IV INF INIT: CPT

## 2019-07-19 RX ADMIN — HYDROCODONE BITARTRATE AND ACETAMINOPHEN PRN TAB: 5; 325 TABLET ORAL at 22:27

## 2019-07-19 NOTE — NUR
PIV ESTABLISHED. MEDICATIONS ADMINSTERED PER ORDER. PT IN ACUTE DISTRESS WITH 
PAIN. FAMILY BEDSIDE.

## 2019-07-19 NOTE — NUR
PT RESTING ONGURNEY. SECOND LITER OF IVF ADMINISTERED. NO ACUTE DISTRESS NOTED. 
NO NEEDS REQUESTED AT THIS TIME. AWAITING CT. PT AWARE AND VERBALIZES 
UNDERSTANDING REGARDING POC.

## 2019-07-19 NOTE — NUR
24 Y/O FEMALE PRESENTS TO ED WITH C/O HIGH FEVER AND ABDOMINAL PAIN. PER PT 
"YESTERDAY I GOT SOME ABDOMINAL PAIN. TODAY I STARTED BURNING UP AND THE PAIN 
WENT EVERYWHERE. MY LOWER BACK HURTS TOO." PT PLACED ON CONT PULSE OX,NIBP. NO 
C/O N/V/D, TRAUMA, SYNCOPE, CP, SOB.

## 2019-07-19 NOTE — NUR
PT RESTING ON GURNEY. PT STATES "THE PAIN IS BETTER, BUT I CAN STILL FEEL IT. 
NOT LIKE IT WAS." NO ACUTE DISTRESS NOTED. NO NEEDS REQUESTED AT THIS TIME.

## 2019-07-19 NOTE — NUR
REPORT TO ABDOUL FOR ROOM 405-2.  PT MEDICATED PER EMAR.  PT HAVING ACTIVE 
VOMITING.  PT MEDICATED FOR THIS.

## 2019-07-20 VITALS — SYSTOLIC BLOOD PRESSURE: 103 MMHG | DIASTOLIC BLOOD PRESSURE: 65 MMHG

## 2019-07-20 VITALS — DIASTOLIC BLOOD PRESSURE: 66 MMHG | SYSTOLIC BLOOD PRESSURE: 109 MMHG

## 2019-07-20 VITALS — SYSTOLIC BLOOD PRESSURE: 120 MMHG | DIASTOLIC BLOOD PRESSURE: 74 MMHG

## 2019-07-20 VITALS — SYSTOLIC BLOOD PRESSURE: 104 MMHG | DIASTOLIC BLOOD PRESSURE: 68 MMHG

## 2019-07-20 LAB
ANION GAP SERPL CALC-SCNC: 7 MMOL/L (ref 5–15)
BASOPHILS # BLD AUTO: 0.01 X10^3/UL (ref 0–0.1)
BASOPHILS NFR BLD AUTO: 0 % (ref 0–1)
CALCIUM SERPL-MCNC: 7.7 MG/DL (ref 8.5–10.1)
CHLORIDE SERPL-SCNC: 112 MMOL/L (ref 98–107)
CREAT SERPL-MCNC: 0.63 MG/DL (ref 0.55–1.02)
EOSINOPHIL # BLD AUTO: 0 X10^3/UL (ref 0–0.4)
EOSINOPHIL NFR BLD AUTO: 0 % (ref 1–7)
ERYTHROCYTE [DISTWIDTH] IN BLOOD BY AUTOMATED COUNT: 12.2 % (ref 9.6–15.2)
LYMPHOCYTES # BLD AUTO: 0.51 X10^3/UL (ref 1–3.4)
LYMPHOCYTES NFR BLD AUTO: 3 % (ref 22–44)
MCH RBC QN AUTO: 28.8 PG (ref 27–34.8)
MCHC RBC AUTO-ENTMCNC: 33.2 G/DL (ref 32.4–35.8)
MCV RBC AUTO: 86.8 FL (ref 80–100)
MD: NO
MONOCYTES # BLD AUTO: 0.78 X10^3/UL (ref 0.2–0.8)
MONOCYTES NFR BLD AUTO: 5 % (ref 2–9)
NEUTROPHILS # BLD AUTO: 13.49 X10^3/UL (ref 1.8–6.8)
NEUTROPHILS NFR BLD AUTO: 91 % (ref 42–75)
PLATELET # BLD AUTO: 219 X10^3/UL (ref 130–400)
PMV BLD AUTO: 8.1 FL (ref 7.4–10.4)
RBC # BLD AUTO: 4.36 X10^6/UL (ref 3.82–5.3)

## 2019-07-20 RX ADMIN — HYDROCODONE BITARTRATE AND ACETAMINOPHEN PRN TAB: 5; 325 TABLET ORAL at 05:23

## 2019-07-20 RX ADMIN — SODIUM CHLORIDE SCH MLS/HR: 0.9 INJECTION, SOLUTION INTRAVENOUS at 16:54

## 2019-07-20 RX ADMIN — HYDROCODONE BITARTRATE AND ACETAMINOPHEN PRN TAB: 5; 325 TABLET ORAL at 15:02

## 2019-07-20 RX ADMIN — SODIUM CHLORIDE SCH MLS/HR: 0.9 INJECTION, SOLUTION INTRAVENOUS at 01:29

## 2019-07-20 RX ADMIN — SODIUM CHLORIDE SCH MLS/HR: 0.9 INJECTION, SOLUTION INTRAVENOUS at 23:44

## 2019-07-20 RX ADMIN — SODIUM CHLORIDE SCH MLS/HR: 0.9 INJECTION, SOLUTION INTRAVENOUS at 09:52

## 2019-07-21 VITALS — DIASTOLIC BLOOD PRESSURE: 70 MMHG | SYSTOLIC BLOOD PRESSURE: 113 MMHG

## 2019-07-21 VITALS — DIASTOLIC BLOOD PRESSURE: 78 MMHG | SYSTOLIC BLOOD PRESSURE: 119 MMHG

## 2019-07-21 LAB
ALBUMIN SERPL-MCNC: 2.7 G/DL (ref 3.4–5)
ALP SERPL-CCNC: 80 U/L (ref 45–117)
ALT SERPL-CCNC: 72 U/L (ref 12–78)
ANION GAP SERPL CALC-SCNC: 7 MMOL/L (ref 5–15)
BASOPHILS # BLD AUTO: 0.02 X10^3/UL (ref 0–0.1)
BASOPHILS NFR BLD AUTO: 0 % (ref 0–1)
BILIRUB SERPL-MCNC: 0.2 MG/DL (ref 0.2–1)
CALCIUM SERPL-MCNC: 8 MG/DL (ref 8.5–10.1)
CHLORIDE SERPL-SCNC: 114 MMOL/L (ref 98–107)
CREAT SERPL-MCNC: 0.49 MG/DL (ref 0.55–1.02)
EOSINOPHIL # BLD AUTO: 0.12 X10^3/UL (ref 0–0.4)
EOSINOPHIL NFR BLD AUTO: 1 % (ref 1–7)
ERYTHROCYTE [DISTWIDTH] IN BLOOD BY AUTOMATED COUNT: 12.9 % (ref 9.6–15.2)
LYMPHOCYTES # BLD AUTO: 1.97 X10^3/UL (ref 1–3.4)
LYMPHOCYTES NFR BLD AUTO: 20 % (ref 22–44)
MCH RBC QN AUTO: 29.6 PG (ref 27–34.8)
MCHC RBC AUTO-ENTMCNC: 33.2 G/DL (ref 32.4–35.8)
MCV RBC AUTO: 89.2 FL (ref 80–100)
MD: NO
MONOCYTES # BLD AUTO: 0.53 X10^3/UL (ref 0.2–0.8)
MONOCYTES NFR BLD AUTO: 6 % (ref 2–9)
NEUTROPHILS # BLD AUTO: 7.03 X10^3/UL (ref 1.8–6.8)
NEUTROPHILS NFR BLD AUTO: 73 % (ref 42–75)
PLATELET # BLD AUTO: 175 X10^3/UL (ref 130–400)
PMV BLD AUTO: 8.5 FL (ref 7.4–10.4)
PROT SERPL-MCNC: 6.5 G/DL (ref 6.4–8.2)
RBC # BLD AUTO: 4.35 X10^6/UL (ref 3.82–5.3)

## 2019-07-21 RX ADMIN — SODIUM CHLORIDE SCH MLS/HR: 0.9 INJECTION, SOLUTION INTRAVENOUS at 06:15

## 2019-07-21 RX ADMIN — HYDROCODONE BITARTRATE AND ACETAMINOPHEN PRN TAB: 5; 325 TABLET ORAL at 10:35

## 2019-09-01 NOTE — PROGRESS NOTES
21 y.o.  32w1d The patient is here for routine obstetrical followup. She reports good fetal movement. She denies contractions, vaginal bleeding, or leaking of fluid.  C/O intermittent dizziness, occuring randomly, lasting 3-4 seconds.  Denies palpitations, URI symptoms.     The patient's pregnancy is complicated by   Patient Active Problem List    Diagnosis Date Noted   • Supervision of normal pregnancy in third trimester 2017   • Late prenatal care 2017   Glucola in progress  Prenatal panel reviewed - recommend feso4 325 mg po bid and increasing fluids for dizziness.  If symptoms persist, f/u at ER.    US reviewed - femurs shorter compared to other measurements, otherwise consistent with dates.       Tdap today.    Followup in 2 weeks   labor precautions were discussed with patient  Fetal kick counts were discussed with patient     Problem: Physical Therapy Goal  Goal: Physical Therapy Goal  Goals to be met by: 2019     Patient will increase functional independence with mobility by performin. Supine to sit with Contact Guard Assistance  2. Sit to stand transfer with Contact Guard Assistance  3. Gait  x 50 feet with Contact Guard Assistance using Rolling Walker.     Comments: The patient is participating in PT as per plan of care. Patient performed therapeutic exercise in bed.

## 2020-06-25 NOTE — NUR
Inpatient device interrogation and/or reprogramming orders received; the industry representative was contacted and provided with patient's name and room number.          PT TO ROOM AT THIS TIME. PT GIVEN GOWN.

## 2020-10-09 ENCOUNTER — HOSPITAL ENCOUNTER (EMERGENCY)
Dept: HOSPITAL 8 - ED | Age: 25
Discharge: HOME | End: 2020-10-09
Payer: SELF-PAY

## 2020-10-09 VITALS — SYSTOLIC BLOOD PRESSURE: 114 MMHG | DIASTOLIC BLOOD PRESSURE: 78 MMHG

## 2020-10-09 VITALS — HEIGHT: 62 IN | BODY MASS INDEX: 28.16 KG/M2 | WEIGHT: 153 LBS

## 2020-10-09 DIAGNOSIS — R11.0: ICD-10-CM

## 2020-10-09 DIAGNOSIS — Z3A.09: ICD-10-CM

## 2020-10-09 DIAGNOSIS — O23.11: Primary | ICD-10-CM

## 2020-10-09 DIAGNOSIS — R10.2: ICD-10-CM

## 2020-10-09 LAB
ALBUMIN SERPL-MCNC: 3.6 G/DL (ref 3.4–5)
ANION GAP SERPL CALC-SCNC: 5 MMOL/L (ref 5–15)
BASOPHILS # BLD AUTO: 0.1 X10^3/UL (ref 0–0.1)
BASOPHILS NFR BLD AUTO: 1 % (ref 0–1)
CALCIUM SERPL-MCNC: 9.1 MG/DL (ref 8.5–10.1)
CHLORIDE SERPL-SCNC: 107 MMOL/L (ref 98–107)
CREAT SERPL-MCNC: 0.64 MG/DL (ref 0.55–1.02)
EOSINOPHIL # BLD AUTO: 0.1 X10^3/UL (ref 0–0.4)
EOSINOPHIL NFR BLD AUTO: 1 % (ref 1–7)
ERYTHROCYTE [DISTWIDTH] IN BLOOD BY AUTOMATED COUNT: 12.6 % (ref 9.6–15.2)
LYMPHOCYTES # BLD AUTO: 2.3 X10^3/UL (ref 1–3.4)
LYMPHOCYTES NFR BLD AUTO: 21 % (ref 22–44)
MCH RBC QN AUTO: 28.6 PG (ref 27–34.8)
MCHC RBC AUTO-ENTMCNC: 34.1 G/DL (ref 32.4–35.8)
MD: NO
MICROSCOPIC: (no result)
MONOCYTES # BLD AUTO: 0.6 X10^3/UL (ref 0.2–0.8)
MONOCYTES NFR BLD AUTO: 5 % (ref 2–9)
NEUTROPHILS # BLD AUTO: 8 X10^3/UL (ref 1.8–6.8)
NEUTROPHILS NFR BLD AUTO: 72 % (ref 42–75)
PLATELET # BLD AUTO: 305 X10^3/UL (ref 130–400)
PMV BLD AUTO: 8.4 FL (ref 7.4–10.4)
RBC # BLD AUTO: 4.72 X10^6/UL (ref 3.82–5.3)

## 2020-10-09 PROCEDURE — 87086 URINE CULTURE/COLONY COUNT: CPT

## 2020-10-09 PROCEDURE — 82040 ASSAY OF SERUM ALBUMIN: CPT

## 2020-10-09 PROCEDURE — 80048 BASIC METABOLIC PNL TOTAL CA: CPT

## 2020-10-09 PROCEDURE — 87077 CULTURE AEROBIC IDENTIFY: CPT

## 2020-10-09 PROCEDURE — 81001 URINALYSIS AUTO W/SCOPE: CPT

## 2020-10-09 PROCEDURE — 76801 OB US < 14 WKS SINGLE FETUS: CPT

## 2020-10-09 PROCEDURE — 99284 EMERGENCY DEPT VISIT MOD MDM: CPT

## 2020-10-09 PROCEDURE — 85025 COMPLETE CBC W/AUTO DIFF WBC: CPT

## 2020-10-09 PROCEDURE — 36415 COLL VENOUS BLD VENIPUNCTURE: CPT

## 2020-10-09 PROCEDURE — 84702 CHORIONIC GONADOTROPIN TEST: CPT

## 2020-10-09 PROCEDURE — 87186 SC STD MICRODIL/AGAR DIL: CPT

## 2020-11-09 ENCOUNTER — GYNECOLOGY VISIT (OUTPATIENT)
Dept: OBGYN | Facility: CLINIC | Age: 25
End: 2020-11-09

## 2020-11-09 VITALS
WEIGHT: 150 LBS | BODY MASS INDEX: 27.6 KG/M2 | SYSTOLIC BLOOD PRESSURE: 104 MMHG | DIASTOLIC BLOOD PRESSURE: 60 MMHG | HEIGHT: 62 IN

## 2020-11-09 DIAGNOSIS — N93.8 DUB (DYSFUNCTIONAL UTERINE BLEEDING): ICD-10-CM

## 2020-11-09 PROCEDURE — 76857 US EXAM PELVIC LIMITED: CPT | Performed by: OBSTETRICS & GYNECOLOGY

## 2020-11-09 PROCEDURE — 99203 OFFICE O/P NEW LOW 30 MIN: CPT | Mod: 25 | Performed by: OBSTETRICS & GYNECOLOGY

## 2020-11-09 NOTE — NON-PROVIDER
Patient here for GYN/DUB.  UPT=positive  LMP=8/8/2020.  AYLIN=5/15/2021  GA=13w2d  Patient went to [facility] almost 1 month ago, she was told she was 8 weeks  Last pap: 2017=negative  Phone number:[phone]  Pharmacy verified  C/o nausea, not able to keep food down. Was given Zofran, it work okay for her. No more pills

## 2020-11-09 NOTE — PROGRESS NOTES
"Subjective:      Gissell Colon is a 25 y.o. female who presents for Gynecologic Exam (DUB)            HPI patient is a 25-year-old  5 para 4 who presents today with amenorrhea and positive home pregnancy test.  Patient is doing well today except for some intermittent nausea.  She is taking prenatal vitamins.  Pregnancy was unplanned but desired.  Patient was not using any contraception.  Patient has no other concerns today    ROS all organ systems are reviewed and were negative except for complaints in HPI       Objective:     /60 (BP Location: Left arm, Patient Position: Sitting)   Ht 1.575 m (5' 2\")   Wt 68 kg (150 lb)   LMP 2020   BMI 27.44 kg/m²      Physical Exam  Vitals signs and nursing note reviewed. Exam conducted with a chaperone present.   Constitutional:       General: She is not in acute distress.     Appearance: Normal appearance. She is not toxic-appearing.   HENT:      Head: Normocephalic and atraumatic.      Nose: No congestion or rhinorrhea.   Eyes:      General: No scleral icterus.        Right eye: No discharge.         Left eye: No discharge.      Conjunctiva/sclera: Conjunctivae normal.   Neck:      Musculoskeletal: Normal range of motion and neck supple. No neck rigidity.   Cardiovascular:      Rate and Rhythm: Normal rate and regular rhythm.      Pulses: Normal pulses.      Heart sounds: Normal heart sounds. No murmur.   Pulmonary:      Effort: Pulmonary effort is normal. No respiratory distress.      Breath sounds: Normal breath sounds. No wheezing.   Abdominal:      General: Abdomen is flat. Bowel sounds are normal. There is no distension.      Palpations: Abdomen is soft.      Tenderness: There is no abdominal tenderness.   Musculoskeletal: Normal range of motion.      Right lower leg: No edema.      Left lower leg: No edema.   Lymphadenopathy:      Cervical: No cervical adenopathy.   Skin:     General: Skin is warm and dry.      Coloration: Skin is not " jaundiced.      Findings: No bruising.   Neurological:      General: No focal deficit present.      Mental Status: She is alert and oriented to person, place, and time.      Cranial Nerves: No cranial nerve deficit.      Motor: No weakness.   Psychiatric:         Mood and Affect: Mood normal.         Behavior: Behavior normal.         Thought Content: Thought content normal.         Judgment: Judgment normal.            Transabdominal ultrasound was performed and read by me:    Kerr intrauterine pregnancy in variable presentation noted  Fetal heart rate was 155 bpm  Fetal biometry measurements and also crown-rump length measurement gives a gestational age of 13 weeks and 3 days gestation  EDC by fetal biometry is 5/14/2021  EDC by LMP is 5/15/2021  No adnexal masses noted  Placenta is anterior fundal  Amniotic fluid appears subjectively normal    Impression: Kerr intrauterine pregnancy at 13 weeks and 3 days gestation with final EDC of 5/15/2021     Assessment/Plan:        1.  Secondary amenorrhea  25-year-old multiparous female presenting with amenorrhea and positive home pregnancy test.  Normal intrauterine pregnancy confirmed at 13 weeks and 3 days gestation.  Findings are reviewed  Pregnancy precautions and restrictions were discussed  Patient to continue prenatal vitamins and increase p.o. fluids  - POCT Pregnancy    2.  Precautions and plan of care reviewed.  Patient to follow-up in 1 week for new OB

## 2020-12-03 ENCOUNTER — APPOINTMENT (OUTPATIENT)
Dept: OBGYN | Facility: CLINIC | Age: 25
End: 2020-12-03

## 2020-12-18 ENCOUNTER — INITIAL PRENATAL (OUTPATIENT)
Dept: OBGYN | Facility: CLINIC | Age: 25
End: 2020-12-18

## 2020-12-18 ENCOUNTER — HOSPITAL ENCOUNTER (OUTPATIENT)
Facility: MEDICAL CENTER | Age: 25
End: 2020-12-18
Attending: NURSE PRACTITIONER
Payer: COMMERCIAL

## 2020-12-18 VITALS — SYSTOLIC BLOOD PRESSURE: 114 MMHG | WEIGHT: 150 LBS | BODY MASS INDEX: 27.44 KG/M2 | DIASTOLIC BLOOD PRESSURE: 66 MMHG

## 2020-12-18 DIAGNOSIS — Z34.82 ENCOUNTER FOR SUPERVISION OF OTHER NORMAL PREGNANCY IN SECOND TRIMESTER: Primary | ICD-10-CM

## 2020-12-18 PROBLEM — Z34.92 ENCOUNTER FOR SUPERVISION OF NORMAL PREGNANCY IN SECOND TRIMESTER: Status: ACTIVE | Noted: 2020-12-18

## 2020-12-18 LAB
APPEARANCE UR: NORMAL
BILIRUB UR STRIP-MCNC: NORMAL MG/DL
COLOR UR AUTO: YELLOW
GLUCOSE UR STRIP.AUTO-MCNC: NEGATIVE MG/DL
KETONES UR STRIP.AUTO-MCNC: NEGATIVE MG/DL
LEUKOCYTE ESTERASE UR QL STRIP.AUTO: NORMAL
NITRITE UR QL STRIP.AUTO: NEGATIVE
PH UR STRIP.AUTO: 7 [PH] (ref 5–8)
PROT UR QL STRIP: NORMAL MG/DL
RBC UR QL AUTO: NEGATIVE
SP GR UR STRIP.AUTO: 1.02
UROBILINOGEN UR STRIP-MCNC: NORMAL MG/DL

## 2020-12-18 PROCEDURE — 8198 PREG CTR PKG RATE (SYSTEM): Performed by: NURSE PRACTITIONER

## 2020-12-18 PROCEDURE — 81002 URINALYSIS NONAUTO W/O SCOPE: CPT | Performed by: NURSE PRACTITIONER

## 2020-12-18 PROCEDURE — 0500F INITIAL PRENATAL CARE VISIT: CPT | Performed by: NURSE PRACTITIONER

## 2020-12-18 ASSESSMENT — ENCOUNTER SYMPTOMS
GASTROINTESTINAL NEGATIVE: 1
CARDIOVASCULAR NEGATIVE: 1
PSYCHIATRIC NEGATIVE: 1
MUSCULOSKELETAL NEGATIVE: 1
RESPIRATORY NEGATIVE: 1
NEUROLOGICAL NEGATIVE: 1
CONSTITUTIONAL NEGATIVE: 1
EYES NEGATIVE: 1

## 2020-12-18 ASSESSMENT — EDINBURGH POSTNATAL DEPRESSION SCALE (EPDS)
I HAVE LOOKED FORWARD WITH ENJOYMENT TO THINGS: AS MUCH AS I EVER DID
I HAVE BEEN SO UNHAPPY THAT I HAVE HAD DIFFICULTY SLEEPING: NOT AT ALL
THINGS HAVE BEEN GETTING ON TOP OF ME: NO, MOST OF THE TIME I HAVE COPED QUITE WELL
TOTAL SCORE: 1
I HAVE BEEN SO UNHAPPY THAT I HAVE BEEN CRYING: NO, NEVER
I HAVE BEEN ANXIOUS OR WORRIED FOR NO GOOD REASON: NO, NOT AT ALL
I HAVE FELT SCARED OR PANICKY FOR NO GOOD REASON: NO, NOT AT ALL
I HAVE BEEN ABLE TO LAUGH AND SEE THE FUNNY SIDE OF THINGS: AS MUCH AS I ALWAYS COULD
THE THOUGHT OF HARMING MYSELF HAS OCCURRED TO ME: NEVER
I HAVE BLAMED MYSELF UNNECESSARILY WHEN THINGS WENT WRONG: NO, NEVER
I HAVE FELT SAD OR MISERABLE: NO, NOT AT ALL

## 2020-12-18 NOTE — NON-PROVIDER
NOB today  LMP: 08/08/2020  Last pap:04/03/2017, negative  Phone # [phone]  Pharmacy confirmed  On PNV  Cystic Fibrosis test offered.  Declines flu vaccine  C/o n/v, some pelvic pressure and px, constipation  Declined AFP

## 2020-12-18 NOTE — PROGRESS NOTES
Subjective:      S:  Gissell Colon is a 25 y.o.  female  @ She is 18w6d with an AYLIN of Estimated Date of Delivery: 5/15/21 based off of LMP who presents for her new OB exam.      Her OB hx includes  x4.   History of HSV I or II in self or partner: no  History of STIs in self or partner: no  History of Thyroid problems: no    No ER visits or previous care in this pregnancy. She has no complaints.   Declines AFP.  Declines CF.  Reports no FM, VB, LOF, or cramping.  Denies dysuria, vaginal DC.  Pt is  and lives with FOB. FOB is sup[portive. She is currently working as , no heavy lifting, no chemical exposure.  Pregnancy is unplanned but desired.    O:    Vitals:    20 1409   BP: 114/66   Weight: 68 kg (150 lb)    See H&P Prenatal Physical.  Wet mount: not indicated        FHTs: 150        Fundal ht: 18cm     A:   1.  IUP @ 18w6d AYLIN: Estimated Date of Delivery: 5/15/21 per LMP         2.  S=D        3.    Patient Active Problem List    Diagnosis Date Noted   • Encounter for supervision of normal pregnancy in second trimester 2020         P:  1.  GC/CT done. Pap done.         2.  Prenatal labs and UDS ordered - lab slip given        3.  Discussed diet and exercise during pregnancy. Encouraged good nutrition, and daily exercise including walking or swimming. Discussed expected weight gain during pregnancy.              4.  Discussed adequate hydration during pregnancy.        5.  NOB packet given        6.  Return to office in 4 wks        7.  Complete OB US in 1-2 wks        8.  Pregnancy guide provided        9.  Childbirth education discussed. Not candidate for Centering Pregnancy    HPI    Review of Systems   Constitutional: Negative.    HENT: Negative.    Eyes: Negative.    Respiratory: Negative.    Cardiovascular: Negative.    Gastrointestinal: Negative.    Genitourinary: Negative.    Musculoskeletal: Negative.    Skin: Negative.    Neurological: Negative.     Endo/Heme/Allergies: Negative.    Psychiatric/Behavioral: Negative.           Objective:     /66   Wt 68 kg (150 lb)   LMP 08/08/2020   BMI 27.44 kg/m²      Physical Exam  Vitals signs and nursing note reviewed.   Constitutional:       Appearance: She is well-developed.   Neck:      Musculoskeletal: Normal range of motion and neck supple.   Cardiovascular:      Rate and Rhythm: Normal rate and regular rhythm.      Heart sounds: Normal heart sounds.   Pulmonary:      Effort: Pulmonary effort is normal.      Breath sounds: Normal breath sounds.   Abdominal:      Palpations: Abdomen is soft.   Genitourinary:     Vagina: Normal.      Comments: Uterus enlarged, c/w 18 wk ga  Musculoskeletal: Normal range of motion.   Skin:     General: Skin is warm and dry.   Neurological:      Mental Status: She is alert and oriented to person, place, and time.      Deep Tendon Reflexes: Reflexes are normal and symmetric.   Psychiatric:         Behavior: Behavior normal.         Thought Content: Thought content normal.         Judgment: Judgment normal.                 Assessment/Plan:        1. Encounter for supervision of other normal pregnancy in second trimester    - POCT Urinalysis  - PRENATAL PANEL 3+HIV+HCV; Future  - URINE DRUG SCREEN W/CONF (AR); Future  - US-OB 2ND 3RD TRI COMPLETE; Future  - URINALYSIS,CULTURE IF INDICATED; Future  - THINPREP RFLX HPV ASCUS W/CTNG; Future  - HEP C VIRUS ANTIBODY; Future

## 2020-12-19 DIAGNOSIS — Z34.82 ENCOUNTER FOR SUPERVISION OF OTHER NORMAL PREGNANCY IN SECOND TRIMESTER: ICD-10-CM

## 2021-01-07 ENCOUNTER — APPOINTMENT (OUTPATIENT)
Dept: RADIOLOGY | Facility: IMAGING CENTER | Age: 26
End: 2021-01-07
Attending: NURSE PRACTITIONER

## 2021-01-07 DIAGNOSIS — Z34.82 ENCOUNTER FOR SUPERVISION OF OTHER NORMAL PREGNANCY IN SECOND TRIMESTER: ICD-10-CM

## 2021-01-07 PROCEDURE — 76805 OB US >/= 14 WKS SNGL FETUS: CPT | Mod: TC | Performed by: NURSE PRACTITIONER

## 2021-01-26 ENCOUNTER — HOSPITAL ENCOUNTER (OUTPATIENT)
Dept: LAB | Facility: MEDICAL CENTER | Age: 26
End: 2021-01-26
Attending: NURSE PRACTITIONER
Payer: COMMERCIAL

## 2021-01-26 DIAGNOSIS — Z34.82 ENCOUNTER FOR SUPERVISION OF OTHER NORMAL PREGNANCY IN SECOND TRIMESTER: ICD-10-CM

## 2021-01-26 LAB
ABO GROUP BLD: NORMAL
APPEARANCE UR: CLEAR
BACTERIA #/AREA URNS HPF: ABNORMAL /HPF
BASOPHILS # BLD AUTO: 0.3 % (ref 0–1.8)
BASOPHILS # BLD: 0.04 K/UL (ref 0–0.12)
BILIRUB UR QL STRIP.AUTO: NEGATIVE
BLD GP AB SCN SERPL QL: NORMAL
COLOR UR: YELLOW
EOSINOPHIL # BLD AUTO: 0.14 K/UL (ref 0–0.51)
EOSINOPHIL NFR BLD: 1 % (ref 0–6.9)
EPI CELLS #/AREA URNS HPF: ABNORMAL /HPF
ERYTHROCYTE [DISTWIDTH] IN BLOOD BY AUTOMATED COUNT: 40.1 FL (ref 35.9–50)
GLUCOSE UR STRIP.AUTO-MCNC: NEGATIVE MG/DL
HBV SURFACE AG SER QL: ABNORMAL
HCT VFR BLD AUTO: 36 % (ref 37–47)
HCV AB SER QL: ABNORMAL
HGB BLD-MCNC: 12.3 G/DL (ref 12–16)
HIV 1+2 AB+HIV1 P24 AG SERPL QL IA: NORMAL
IMM GRANULOCYTES # BLD AUTO: 0.1 K/UL (ref 0–0.11)
IMM GRANULOCYTES NFR BLD AUTO: 0.7 % (ref 0–0.9)
KETONES UR STRIP.AUTO-MCNC: NEGATIVE MG/DL
LEUKOCYTE ESTERASE UR QL STRIP.AUTO: ABNORMAL
LYMPHOCYTES # BLD AUTO: 2.2 K/UL (ref 1–4.8)
LYMPHOCYTES NFR BLD: 16 % (ref 22–41)
MCH RBC QN AUTO: 29.9 PG (ref 27–33)
MCHC RBC AUTO-ENTMCNC: 34.2 G/DL (ref 33.6–35)
MCV RBC AUTO: 87.4 FL (ref 81.4–97.8)
MICRO URNS: ABNORMAL
MONOCYTES # BLD AUTO: 1.01 K/UL (ref 0–0.85)
MONOCYTES NFR BLD AUTO: 7.4 % (ref 0–13.4)
MUCOUS THREADS #/AREA URNS HPF: ABNORMAL /HPF
NEUTROPHILS # BLD AUTO: 10.25 K/UL (ref 2–7.15)
NEUTROPHILS NFR BLD: 74.6 % (ref 44–72)
NITRITE UR QL STRIP.AUTO: POSITIVE
NRBC # BLD AUTO: 0 K/UL
NRBC BLD-RTO: 0 /100 WBC
PH UR STRIP.AUTO: 6.5 [PH] (ref 5–8)
PLATELET # BLD AUTO: 265 K/UL (ref 164–446)
PMV BLD AUTO: 10.6 FL (ref 9–12.9)
PROT UR QL STRIP: NEGATIVE MG/DL
RBC # BLD AUTO: 4.12 M/UL (ref 4.2–5.4)
RBC # URNS HPF: ABNORMAL /HPF
RBC UR QL AUTO: NEGATIVE
RH BLD: NORMAL
RUBV AB SER QL: 55.8 IU/ML
SP GR UR STRIP.AUTO: 1.02
TREPONEMA PALLIDUM IGG+IGM AB [PRESENCE] IN SERUM OR PLASMA BY IMMUNOASSAY: ABNORMAL
UROBILINOGEN UR STRIP.AUTO-MCNC: 0.2 MG/DL
WBC # BLD AUTO: 13.7 K/UL (ref 4.8–10.8)
WBC #/AREA URNS HPF: ABNORMAL /HPF

## 2021-01-27 ENCOUNTER — ROUTINE PRENATAL (OUTPATIENT)
Dept: OBGYN | Facility: CLINIC | Age: 26
End: 2021-01-27

## 2021-01-27 VITALS — BODY MASS INDEX: 28.53 KG/M2 | DIASTOLIC BLOOD PRESSURE: 70 MMHG | WEIGHT: 156 LBS | SYSTOLIC BLOOD PRESSURE: 110 MMHG

## 2021-01-27 DIAGNOSIS — Z34.82 ENCOUNTER FOR SUPERVISION OF OTHER NORMAL PREGNANCY IN SECOND TRIMESTER: Primary | ICD-10-CM

## 2021-01-27 DIAGNOSIS — O23.42 UTI IN PREGNANCY, ANTEPARTUM, SECOND TRIMESTER: ICD-10-CM

## 2021-01-27 PROCEDURE — 90040 PR PRENATAL FOLLOW UP: CPT | Performed by: NURSE PRACTITIONER

## 2021-01-27 RX ORDER — NITROFURANTOIN 25; 75 MG/1; MG/1
100 CAPSULE ORAL 2 TIMES DAILY
Qty: 14 CAP | Refills: 0 | Status: SHIPPED | OUTPATIENT
Start: 2021-01-27 | End: 2021-02-03

## 2021-01-27 NOTE — PROGRESS NOTES
OB follow up   + fetal movement.  No VB, LOF or UC's.  Flu vaccine declined before  Phone # [phone]  Preferred pharmacy confirmed.  PNP done yesterday. 1 GTT order pended

## 2021-01-27 NOTE — PROGRESS NOTES
S:  Pt is  at 24w4d here for routine OB follow up.  Reports cramping and low back pain.  Reports good FM.  Denies VB, RUCs, LOF or vaginal DC.  Denies cough, SOB, sore throat or fever.  Denies exposure to anyone with COVID 19.    O:    Vitals:    21 1406   BP: 110/70   Weight: 70.8 kg (156 lb)           FHTs: 155        Fundal ht: 24 cm.    Complete OB US  2021 2:21 PM     HISTORY/REASON FOR EXAM:  Evaluate fetal anatomy     TECHNIQUE/EXAM DESCRIPTION: OB complete ultrasound.     COMPARISON:  None     FINDINGS:  Fetal Lie:  Vertex  LMP:  2020  Clinical AYLIN by LMP:  5/15/2021     Placenta (Location):  Posterior  Placenta Previa: No  Placental Grade: I     Amniotic Fluid Volume:  TERRY = 15.80 cm     Fetal Heart Rate:  156 bpm     Cervical Length:  4.15 cm transabdominal     No maternal adnexal mass is identified.     Umbilical Artery S/D Ratio(s):  Not applicable     Fetal Anatomy  (Seen or Not Seen)  Lateral Ventricles     Seen  Cisterna Magna        Seen  Cerebellum              Seen  CSP             Seen  Orbits             Seen  Face/Lips                Seen  Cord Insertion         Seen  Placental CI         Seen  4 Chamber Heart     Seen  LVOT               Seen  RVOT              Seen  3 Vessel View     Seen  Stomach       Seen  Kidneys                   Seen  Urinary Bladder      Seen  Spine                       Seen  3 Vessel Cord          Seen  Both Upper Extremities    Seen  Both Lower Extremities    Seen  Diaphragm             Seen  Movement       Seen  Gender:  Likely male     Fetal Biometry  BPD    5.17 cm, 21 weeks, 5 days, (46.2%)  HC    18.83 cm, 21 weeks, 1 day, (17%)  AC    16.08 cm, 21 weeks, 1 day, (25.7%)  Femur Length    3.69 cm, 21 weeks, 5 days, (40.9%)  Humerus Length    3.52 cm, 22 weeks, 1 day, (62.4%)  Cerebellum Diameter   2.22 cm, 20 weeks, 5 days, (36.6%)     EGA by this US:  21 weeks, 3 days  AYLIN by this US: 2021  AYLIN by 1st US:  2021 by MD Gruber  Fetal Weight:  422 grams  EFW Percentile: 28.8%     Comments:     IMPRESSION:     Single intrauterine pregnancy of an estimated gestational age of 21 weeks, 3 days with an estimated date of delivery of 2021.     Fetal survey within normal limits.       A:  IUP at 24w4d  Patient Active Problem List    Diagnosis Date Noted   • UTI in pregnancy, antepartum, second trimester 2021   • Encounter for supervision of normal pregnancy in second trimester 2020       P:  1. Questions answered.           2. Encouraged adequate water intake        3.   labor precautions reviewed.        4.  F/u 4 wks.        5.  1hr GTT ordered.         6.  Labs/US reviewed w pt.         7.  Rx for macrobid sent to pharmacy.

## 2021-01-27 NOTE — PATIENT INSTRUCTIONS
P:  1. Questions answered.           2. Encouraged adequate water intake        3.   labor precautions reviewed.        4.  F/u 4 wks.        5.  1hr GTT ordered.         6.  Labs/US reviewed w pt.         7.  Rx for macrobid sent to pharmacy.

## 2021-01-28 LAB
AMPHET CTO UR CFM-MCNC: NEGATIVE NG/ML
BARBITURATES CTO UR CFM-MCNC: NEGATIVE NG/ML
BENZODIAZ CTO UR CFM-MCNC: NEGATIVE NG/ML
CANNABINOIDS CTO UR CFM-MCNC: NEGATIVE NG/ML
COCAINE CTO UR CFM-MCNC: NEGATIVE NG/ML
DRUG COMMENT 753798: NORMAL
METHADONE CTO UR CFM-MCNC: NEGATIVE NG/ML
OPIATES CTO UR CFM-MCNC: NEGATIVE NG/ML
PCP CTO UR CFM-MCNC: NEGATIVE NG/ML
PROPOXYPH CTO UR CFM-MCNC: NEGATIVE NG/ML

## 2021-01-29 ENCOUNTER — TELEPHONE (OUTPATIENT)
Dept: OBGYN | Facility: CLINIC | Age: 26
End: 2021-01-29

## 2021-01-29 LAB
BACTERIA UR CULT: ABNORMAL
BACTERIA UR CULT: ABNORMAL
SIGNIFICANT IND 70042: ABNORMAL
SITE SITE: ABNORMAL
SOURCE SOURCE: ABNORMAL

## 2021-01-29 NOTE — TELEPHONE ENCOUNTER
----- Message from YESSY Guillaume sent at 1/29/2021  8:01 AM PST -----  Macrobid has been ordered for UTI, please verify that the patient has picked up her prescription      Called pt and pt confirmed she picked up Rx. Advised pt make sure to take full Tx and increase water intake. Pt agreed and verbalized understanding.

## 2021-01-31 ENCOUNTER — PATIENT MESSAGE (OUTPATIENT)
Dept: OBGYN | Facility: CLINIC | Age: 26
End: 2021-01-31

## 2021-02-08 ENCOUNTER — TELEPHONE (OUTPATIENT)
Dept: OBGYN | Facility: CLINIC | Age: 26
End: 2021-02-08

## 2021-02-08 ENCOUNTER — HOSPITAL ENCOUNTER (EMERGENCY)
Facility: MEDICAL CENTER | Age: 26
End: 2021-02-08
Attending: OBSTETRICS & GYNECOLOGY | Admitting: OBSTETRICS & GYNECOLOGY
Payer: MEDICAID

## 2021-02-08 VITALS
OXYGEN SATURATION: 98 % | BODY MASS INDEX: 27.6 KG/M2 | RESPIRATION RATE: 18 BRPM | DIASTOLIC BLOOD PRESSURE: 61 MMHG | TEMPERATURE: 98.4 F | SYSTOLIC BLOOD PRESSURE: 114 MMHG | HEIGHT: 62 IN | WEIGHT: 150 LBS | HEART RATE: 95 BPM

## 2021-02-08 LAB
ALBUMIN SERPL BCP-MCNC: 3.4 G/DL (ref 3.2–4.9)
ALBUMIN/GLOB SERPL: 1.1 G/DL
ALP SERPL-CCNC: 80 U/L (ref 30–99)
ALT SERPL-CCNC: 15 U/L (ref 2–50)
ANION GAP SERPL CALC-SCNC: 13 MMOL/L (ref 7–16)
APPEARANCE UR: ABNORMAL
AST SERPL-CCNC: 17 U/L (ref 12–45)
BASOPHILS # BLD AUTO: 0.2 % (ref 0–1.8)
BASOPHILS # BLD: 0.03 K/UL (ref 0–0.12)
BILIRUB SERPL-MCNC: <0.2 MG/DL (ref 0.1–1.5)
BUN SERPL-MCNC: 6 MG/DL (ref 8–22)
CALCIUM SERPL-MCNC: 8.4 MG/DL (ref 8.5–10.5)
CHLORIDE SERPL-SCNC: 104 MMOL/L (ref 96–112)
CO2 SERPL-SCNC: 20 MMOL/L (ref 20–33)
COLOR UR AUTO: YELLOW
CREAT SERPL-MCNC: 0.45 MG/DL (ref 0.5–1.4)
EOSINOPHIL # BLD AUTO: 0.1 K/UL (ref 0–0.51)
EOSINOPHIL NFR BLD: 0.7 % (ref 0–6.9)
ERYTHROCYTE [DISTWIDTH] IN BLOOD BY AUTOMATED COUNT: 39.8 FL (ref 35.9–50)
GLOBULIN SER CALC-MCNC: 3 G/DL (ref 1.9–3.5)
GLUCOSE SERPL-MCNC: 86 MG/DL (ref 65–99)
GLUCOSE UR QL STRIP.AUTO: NEGATIVE MG/DL
HCT VFR BLD AUTO: 34.6 % (ref 37–47)
HGB BLD-MCNC: 11.7 G/DL (ref 12–16)
IMM GRANULOCYTES # BLD AUTO: 0.06 K/UL (ref 0–0.11)
IMM GRANULOCYTES NFR BLD AUTO: 0.4 % (ref 0–0.9)
KETONES UR QL STRIP.AUTO: NEGATIVE MG/DL
LEUKOCYTE ESTERASE UR QL STRIP.AUTO: ABNORMAL
LYMPHOCYTES # BLD AUTO: 2.31 K/UL (ref 1–4.8)
LYMPHOCYTES NFR BLD: 16.7 % (ref 22–41)
MCH RBC QN AUTO: 29.7 PG (ref 27–33)
MCHC RBC AUTO-ENTMCNC: 33.8 G/DL (ref 33.6–35)
MCV RBC AUTO: 87.8 FL (ref 81.4–97.8)
MONOCYTES # BLD AUTO: 1.03 K/UL (ref 0–0.85)
MONOCYTES NFR BLD AUTO: 7.4 % (ref 0–13.4)
NEUTROPHILS # BLD AUTO: 10.34 K/UL (ref 2–7.15)
NEUTROPHILS NFR BLD: 74.6 % (ref 44–72)
NITRITE UR QL STRIP.AUTO: NEGATIVE
NRBC # BLD AUTO: 0 K/UL
NRBC BLD-RTO: 0 /100 WBC
PH UR STRIP.AUTO: 6 [PH] (ref 5–8)
PLATELET # BLD AUTO: 259 K/UL (ref 164–446)
PMV BLD AUTO: 10.3 FL (ref 9–12.9)
POTASSIUM SERPL-SCNC: 3.7 MMOL/L (ref 3.6–5.5)
PROT SERPL-MCNC: 6.4 G/DL (ref 6–8.2)
PROT UR QL STRIP: ABNORMAL MG/DL
RBC # BLD AUTO: 3.94 M/UL (ref 4.2–5.4)
RBC UR QL AUTO: ABNORMAL
SODIUM SERPL-SCNC: 137 MMOL/L (ref 135–145)
SP GR UR STRIP.AUTO: 1.02 (ref 1–1.03)
WBC # BLD AUTO: 13.9 K/UL (ref 4.8–10.8)

## 2021-02-08 PROCEDURE — 99284 EMERGENCY DEPT VISIT MOD MDM: CPT

## 2021-02-08 PROCEDURE — 85025 COMPLETE CBC W/AUTO DIFF WBC: CPT

## 2021-02-08 PROCEDURE — A9270 NON-COVERED ITEM OR SERVICE: HCPCS | Performed by: NURSE PRACTITIONER

## 2021-02-08 PROCEDURE — 700102 HCHG RX REV CODE 250 W/ 637 OVERRIDE(OP): Performed by: NURSE PRACTITIONER

## 2021-02-08 PROCEDURE — 81002 URINALYSIS NONAUTO W/O SCOPE: CPT

## 2021-02-08 PROCEDURE — 36415 COLL VENOUS BLD VENIPUNCTURE: CPT

## 2021-02-08 PROCEDURE — 99283 EMERGENCY DEPT VISIT LOW MDM: CPT | Performed by: NURSE PRACTITIONER

## 2021-02-08 PROCEDURE — 80053 COMPREHEN METABOLIC PANEL: CPT

## 2021-02-08 RX ORDER — ACETAMINOPHEN 500 MG
1000 TABLET ORAL ONCE
Status: COMPLETED | OUTPATIENT
Start: 2021-02-08 | End: 2021-02-08

## 2021-02-08 RX ADMIN — ACETAMINOPHEN 1000 MG: 500 TABLET ORAL at 20:24

## 2021-02-08 ASSESSMENT — PAIN SCALES - GENERAL: PAINLEVEL: 7

## 2021-02-08 NOTE — TELEPHONE ENCOUNTER
Pt called triage line stating she was diagnosed with a UTI on 1/27/2021, she has 4 pills left to take of medication she was given for UTI, pt states woke up this morning having pelvic pressure and pain with urination and she did not have these symptoms before. Pt states having + FM denies bleeding. Per consult with Mell AVERY, instructed pt to go to [facility] L&D for further evaluation. Pt verbalized understanding and will comply.

## 2021-02-09 NOTE — ED PROVIDER NOTES
"S: Pt is a 25 y.o.  at 26w2d with Estimated Date of Delivery: 5/15/21 who presents to triage c/o back pain and feeling like her UTI is getting worse.  Denies VB, RUCs, LOF.  Reports good FM.    She does have a history of pyelonephritis. Denies any fever, but reports some chills. Has been eating and drinking well now, but had some nausea earlier. States she has lower back pain, down toward her buttocks, but no CVAT. She is currently on Macrobid, and has 2 days left of medication.    O: /61   Pulse 95   Temp 36.9 °C (98.4 °F) (Temporal)   Resp 18   Ht 1.575 m (5' 2\")   Wt 68 kg (150 lb)   LMP 2020   SpO2 98%   BMI 27.44 kg/m²          NST: Deferred due to early gestation         Indication:  IUP, rule out  labor       FHR: 145                [place]: No UCs       SVE: deferred         A/P  Patient Active Problem List    Diagnosis Date Noted   • UTI on intake- needs FRANCES 2021   • Encounter for supervision of normal pregnancy in second trimester 2020     No abdominal tenderness or guarding. Negative CVAT bilaterally. Afebrile at this time, and remainder of VS are stable    UA shows neg nitrates, trace leukocytes, neg ketones, trace blood, trace protein, and SG of 1.025. tylenol has helped, and at this point her CBC and CMP is normal.  She is cleared to discharge to home, pyelo precautions reviewed along with PTL precautions  She is to finish antibiotics, and follow up at clinic as scheduled or return sooner with other concerns    1.  IUP @ 26w2d  2.  No NST due to early GA, but FHT's appropriate for gestation.  3.  UTI- rule out pyelo  4.  CBC, CMP, and new UA in process  5.  May need renal US, but will await results    Will reassess after lab results received.    Veda Bell, KIM, APRN    Addendum at 407:    Lab results reviewed, CBC and CMP WNL.  Tylenol given with some relief  No need for further antibiotics at this time, she is to finish her current course and " follow up if not all the way better after antibiotics are done or sooner if needed    Discharge to home in stable condition    Veda Bell CNM, APRN

## 2021-02-09 NOTE — PROGRESS NOTES
Pt had UTI last week and was started on Macrobid; pt has been on for 7 days. Presents for constant, sharp pain in her lower abd (worse when she moves) that started after vomiting, chills on and off all day with no fever, and constant lower back pain. Reports some pain with CVA tenderness but doesn't jump. Reports cramping for the last 2 days that comes and goes; denies LOF or VB; reports FM. UA collected. Hx of nai  1900: Report given to Marie MOREAU

## 2021-02-09 NOTE — PROGRESS NOTES
[facility] - 5/15/21 EGA - 26.2     - Report received from ASHLEY Gutierrez RN   DARSHANA Bell CNM at bedside POC discussed with pt, orders received for labs and tylenol.   Tylenol given see MAR.  2034 DARSHANA Bell CNM notified of POC UA.   DARSHANA Bell CNM reviewed lab work, orders to d/c pt home at this time.   Discharge instructions given including PTL precautions, and when to return to the hospital. Reviewed with pt results, educated on finishing her abx, hydration and to come back to the hospital if she has a fever of CVA tenderness. Pt verbalizes understanding at this time. All questions answered.   Pt ambulated off the unit with personal belongings in place.

## 2021-02-09 NOTE — DISCHARGE INSTRUCTIONS
General Instructions:  · If you think you are in labor, time contractions (lying on your left side) from the beginning of one contraction to the beginning of the next contraction for at least one hour.  · Increase fluid intake: you should consume 10-12 8 oz glasses of non-caffeinated fluid per day.  · Report any pressure or burning on urination to your physician.  · Monitor fetal movement: If you notice an absence or decrease in fetal movement, drink a large glass of water and rest on your side.  If there is no increase in movement, call your physician or go to the hospital for further evaluation.  · Report any sudden, sharp abdominal pain.  · Report any bleeding.  Spotting or pinkish discharge is normal after vaginal exam.  You may also spot after sexual intercourse.    Pre-term Labor (<37 weeks):  Call your physician or return to the hospital if:  · You have painless regular contractions more than 4 in one hour.  · Your water breaks (remember time and color).  · You have menstrual-like cramps, a low dull backache or pressure in your pelvis or back.  · Your baby does not move enough to complete the daily kick count (10 movements in 2 hours).  · Your baby moves much less often than on the days before or you have not felt your baby move all day.  · Please review the MEDICATION LIST section of your AFTER VISIT SUMMARY document.  · Take your medication as prescribed      Other Instructions:  Please carefully review your entire AFTER VISIT SUMMARY document for all discharge instructions.  
walks 1 hr daily, exercises daily, heavy house work

## 2021-02-10 NOTE — TELEPHONE ENCOUNTER
Spoke with pt and she stated she went to L&D and the docs told her she was fine to go back home. Pt is on a prescription and has two pills left. Consulted with Veda the Midwife and she stated pt to drink more water and if necessary to drink a little bit of Cranberry juice. Veda also states that antibiotics are working pt does not have a UTI anymore. Pt understood and had no further question. Pt was informed to call if symptoms get worse and we can have her come in and do another dip.

## 2021-02-24 ENCOUNTER — ROUTINE PRENATAL (OUTPATIENT)
Dept: OBGYN | Facility: CLINIC | Age: 26
End: 2021-02-24

## 2021-02-24 VITALS — WEIGHT: 161.4 LBS | BODY MASS INDEX: 29.52 KG/M2 | DIASTOLIC BLOOD PRESSURE: 70 MMHG | SYSTOLIC BLOOD PRESSURE: 114 MMHG

## 2021-02-24 DIAGNOSIS — Z34.83 ENCOUNTER FOR SUPERVISION OF OTHER NORMAL PREGNANCY IN THIRD TRIMESTER: Primary | ICD-10-CM

## 2021-02-24 PROBLEM — Z34.93 ENCOUNTER FOR SUPERVISION OF NORMAL PREGNANCY IN THIRD TRIMESTER: Status: ACTIVE | Noted: 2020-12-18

## 2021-02-24 LAB
APPEARANCE UR: NORMAL
BILIRUB UR STRIP-MCNC: NORMAL MG/DL
COLOR UR AUTO: YELLOW
GLUCOSE UR STRIP.AUTO-MCNC: NEGATIVE MG/DL
KETONES UR STRIP.AUTO-MCNC: NEGATIVE MG/DL
LEUKOCYTE ESTERASE UR QL STRIP.AUTO: NORMAL
NITRITE UR QL STRIP.AUTO: NORMAL
PH UR STRIP.AUTO: 7 [PH] (ref 5–8)
PROT UR QL STRIP: NEGATIVE MG/DL
RBC UR QL AUTO: NORMAL
SP GR UR STRIP.AUTO: 1.02
UROBILINOGEN UR STRIP-MCNC: NORMAL MG/DL

## 2021-02-24 PROCEDURE — 81002 URINALYSIS NONAUTO W/O SCOPE: CPT | Performed by: NURSE PRACTITIONER

## 2021-02-24 PROCEDURE — 90040 PR PRENATAL FOLLOW UP: CPT | Performed by: NURSE PRACTITIONER

## 2021-02-24 RX ORDER — NITROFURANTOIN 25; 75 MG/1; MG/1
100 CAPSULE ORAL DAILY
Qty: 38 CAPSULE | Refills: 0 | Status: SHIPPED | OUTPATIENT
Start: 2021-02-24 | End: 2021-04-03

## 2021-02-24 RX ORDER — NITROFURANTOIN 25; 75 MG/1; MG/1
100 CAPSULE ORAL 2 TIMES DAILY
Qty: 14 CAPSULE | Refills: 0 | Status: SHIPPED | OUTPATIENT
Start: 2021-02-24 | End: 2021-03-03

## 2021-02-24 ASSESSMENT — FIBROSIS 4 INDEX: FIB4 SCORE: 0.44

## 2021-02-24 NOTE — PROGRESS NOTES
S) Pt is a 25 y.o.   at 28w4d  gestation. Routine prenatal care today. Complains of feeling like she has a UTI again. She has a history of pyelo per patient about 2 years ago. States she gets frequent painful UTI's even when not pregnant. Her last documented UTI this pregnancy was 2021, and she finished those antibiotics. Has not been on antibiotics since. Will run UA today. Has not done glucose testing yet, but has appointment today after OB follow up to have this done.  labor precautions reviewed, all questions answered.    Fetal movement Normal  Cramping no  VB no  LOF no   Denies dysuria. Generally feels well today. Good self-care activities identified. Denies headaches, swelling, visual changes, or epigastric pain .     O) /70   Wt 73.2 kg (161 lb 6.4 oz)         Labs:       PNL: WNL       GCT: Not done yet        AFP: Not Examined       GBS: N/A       Pertinent ultrasound -        21- Survey WNL, TERRY 15.80cm, c/w prev dating.    A) IUP at 28w4d       S=D    UA: SG 1.020, positive for blood, nitrites, and leukocytes. Negative for glucose and protein. Ph 7.0    Will treat this UTI with macrobid. After her last UTI, she was noted to have a clear UA without infection, so this is likely a new UTI and not continual from before.  At this time, she has no CVAT, no fever, chills, nausea, or vomiting associated with this.  Discussed antibiotics, and also will start prophylactic therapy. RX sent to pharmacy.    Pyelonephritis precautions reviewed, s/sx of worsening infection discussed. Aware of need to follow up for complications         Patient Active Problem List    Diagnosis Date Noted   • UTI on intake- needs FRANCES 2021   • Encounter for supervision of normal pregnancy in third trimester 2020          SVE: deferred       Chaperone offered: n/a         TDAP: no- education provided, will discuss again next visit.       FLU: no        BTL: yes       : n/a       C/S Consent:  n/a       IOL or C/S scheduled: no       LAST PAP: 12/18/20- Negative         P) s/s ptl vs general discomforts. Fetal movements reviewed. General ed and anticipatory guidance. Nutrition/exercise/vitamin. Plans breast Plans pp contraception- unsure  Continue PNV.

## 2021-02-24 NOTE — LETTER
"Count Your Baby's Movements  Another step to a healthy delivery    Gissell Richardson             Dept: [phone]    How Many Weeks Pregnant? 28w4d    Date to Begin Countin2021              How to use this chart    One way for your physician to keep track of your baby's health is by knowing how often the baby moves (or \"kicks\") in your womb.  You can help your physician to do this by using this chart every day.    Every day, you should see how many hours it takes for your baby to move 10 times.  Start in the morning, as soon as you get up.    · First, write down the time your baby moves until you get to 10.  · Check off one box every time your baby moves until you get to 10.  · Write down the time you finished counting in the last column.  · Total how long it took to count up all 10 movements.  · Finally, fill in the box that shows how long this took.  After counting 10 movements, you no longer have to count any more that day.  The next morning, just start counting again as soon as you get up.    What should you call a \"movement\"?  It is hard to say, because it will feel different from one mother to another and from one pregnancy to the next.  The important thing is that you count the movements the same way throughout your pregnancy.  If you have more questions, you should ask your physician.    Count carefully every day!  SAMPLE:  Week 28    How many hours did it take to feel 10 movements?       Start  Time     1     2     3     4     5     6     7     8     9     10   Finish Time   Mon 8:20 ·  ·  ·  ·  ·  ·  ·  ·  ·  ·  11:40                  Sat               Sun                 IMPORTANT: You should contact your physician if it takes more than two hours for you to feel 10 movements.  Each morning, write down the time and start to count the movements of your baby.  Keep track by checking off one box every time you feel one movement.  When you have " "felt 10 \"kicks\", write down the time you finished counting in the last column.  Then fill in the   box (over the check adelita) for the number of hours it took.  Be sure to read the complete instructions on the previous page.            "

## 2021-02-24 NOTE — PROGRESS NOTES
Pt. Here for OB/FU. Reports Good DORA.   Good # [phone]  Pt. Denies VB, LOF, or UC's.   Pharmacy verified.   Chaperone offered and not indicated  Pt states that she will get her 1hr gtt done today. Pt states that she has been getting some UTI was taking medications but is out.   OXANA sheet given today along with instructions  BTL signed today  Tdap declined

## 2021-03-10 ENCOUNTER — ROUTINE PRENATAL (OUTPATIENT)
Dept: OBGYN | Facility: CLINIC | Age: 26
End: 2021-03-10

## 2021-03-10 VITALS — BODY MASS INDEX: 30.36 KG/M2 | DIASTOLIC BLOOD PRESSURE: 60 MMHG | WEIGHT: 166 LBS | SYSTOLIC BLOOD PRESSURE: 120 MMHG

## 2021-03-10 DIAGNOSIS — Z34.83 ENCOUNTER FOR SUPERVISION OF OTHER NORMAL PREGNANCY IN THIRD TRIMESTER: Primary | ICD-10-CM

## 2021-03-10 PROCEDURE — 90040 PR PRENATAL FOLLOW UP: CPT | Performed by: NURSE PRACTITIONER

## 2021-03-10 ASSESSMENT — FIBROSIS 4 INDEX: FIB4 SCORE: 0.44

## 2021-03-10 NOTE — PROGRESS NOTES
S) Pt is a 25 y.o.   at 30w4d  gestation. Routine prenatal care today. Had a little gush of clear fluid out of vagina last night around 4pm while walking, no further leaking since. Has had a lower back ache since.    Fetal movement Normal  Cramping no  VB no  LOF no   Denies dysuria. Generally feels well today. Good self-care activities identified. Denies headaches, swelling, visual changes, or epigastric pain .     O) See flow sheet for vital signs and fetal measurements.          Labs:       PNL: WNL       GCT: not done       AFP: Not Examined       GBS: N/A       Pertinent ultrasound - 21 TERRY 15.8, survey WNL, c/w previous dating           A) IUP at 30w4d       S=D         Patient Active Problem List    Diagnosis Date Noted   • UTI on intake- needs FRANCES 2021   • Encounter for supervision of normal pregnancy in third trimester 2020          SVE: sterile spec done: negative nitrazine, negative pooling, negative fluid from cervix with valsalva, negative ferning, cervix closed         TDAP: no       FLU: no        BTL: no       : no       C/S Consent: no       IOL or C/S scheduled: no       LAST PAP:  20 negative    P) s/s ptl vs general discomforts. Fetal movements reviewed. General ed and anticipatory guidance. Nutrition/exercise/vitamin. Plans breast Plans pp contraception- unsure-given information and will discuss next visit  Continue PNV.   Encouraged to do her third trimester labs  RTC 2 weeks or PRN

## 2021-03-10 NOTE — PROGRESS NOTES
Pt here today for OB follow up  Reports +FM  WT: 166 lb  BP: 120/60  Preferred pharmacy verified with pt.  Pt states she had one episode of small gush clear fluid. States no other episodes since. Although pt states she started having lower back pain since yesterday. States no other complaints or concerns.   Tdap vaccine offered, pt declines vaccine  Pt states she changed her mind and Does Not want to have BTL.  1 hr gtt not yet done. Pt states will get lab done this saturday  Good # [phone]

## 2021-03-13 ENCOUNTER — HOSPITAL ENCOUNTER (OUTPATIENT)
Dept: LAB | Facility: MEDICAL CENTER | Age: 26
End: 2021-03-13
Attending: NURSE PRACTITIONER
Payer: COMMERCIAL

## 2021-03-13 DIAGNOSIS — Z34.82 ENCOUNTER FOR SUPERVISION OF OTHER NORMAL PREGNANCY IN SECOND TRIMESTER: ICD-10-CM

## 2021-03-13 LAB — GLUCOSE 1H P 50 G GLC PO SERPL-MCNC: 132 MG/DL (ref 70–139)

## 2021-03-25 ENCOUNTER — ROUTINE PRENATAL (OUTPATIENT)
Dept: OBGYN | Facility: CLINIC | Age: 26
End: 2021-03-25

## 2021-03-25 VITALS — WEIGHT: 166.2 LBS | DIASTOLIC BLOOD PRESSURE: 60 MMHG | SYSTOLIC BLOOD PRESSURE: 108 MMHG | BODY MASS INDEX: 30.4 KG/M2

## 2021-03-25 DIAGNOSIS — Z34.83 ENCOUNTER FOR SUPERVISION OF OTHER NORMAL PREGNANCY IN THIRD TRIMESTER: ICD-10-CM

## 2021-03-25 PROCEDURE — 90040 PR PRENATAL FOLLOW UP: CPT | Performed by: NURSE PRACTITIONER

## 2021-03-25 ASSESSMENT — FIBROSIS 4 INDEX: FIB4 SCORE: 0.44

## 2021-03-25 NOTE — PROGRESS NOTES
S:  Pt is  at 32w5d for routine OB follow up.  Reports feeling of pressure, back pain, not able to get comfortable at night. No ED or hospital visits since last seen. Reports good FM.  Denies VB, LOF, RUCs or vaginal DC.    O:  Please see above vitals.        FHTs: 150        Fundal ht: 34 cm.        S>D, within parameters, will monitor growth interval        SVE: cl/30/ballotable    A:  IUP at 32w5d  Patient Active Problem List    Diagnosis Date Noted   • UTI on intake- needs FRANCES 2021   • Encounter for supervision of normal pregnancy in third trimester 2020        P:  1.  GBS @ 36 wks.          2.  Continue FKCs.          3.  Questions answered.          4.  Encouraged pt to tour L&D.          5.  Encourage adequate water intake.        6.  F/u 2 wks.        5.  Reviewed PTL precautions

## 2021-03-25 NOTE — PROGRESS NOTES
OB follow up   + fetal movement. Active  No VB, LOF or UC's.  Flu vaccine offered Declined   Phone #  [phone]  Preferred pharmacy confirmed.  C/o  Pelvic pressure   Back pain

## 2021-04-05 ENCOUNTER — ROUTINE PRENATAL (OUTPATIENT)
Dept: OBGYN | Facility: CLINIC | Age: 26
End: 2021-04-05

## 2021-04-05 VITALS — BODY MASS INDEX: 30.62 KG/M2 | DIASTOLIC BLOOD PRESSURE: 80 MMHG | WEIGHT: 167.4 LBS | SYSTOLIC BLOOD PRESSURE: 120 MMHG

## 2021-04-05 DIAGNOSIS — Z34.83 ENCOUNTER FOR SUPERVISION OF OTHER NORMAL PREGNANCY IN THIRD TRIMESTER: Primary | ICD-10-CM

## 2021-04-05 PROCEDURE — 90040 PR PRENATAL FOLLOW UP: CPT | Performed by: NURSE PRACTITIONER

## 2021-04-05 ASSESSMENT — FIBROSIS 4 INDEX: FIB4 SCORE: 0.44

## 2021-04-05 NOTE — PROGRESS NOTES
OB follow up   + fetal movement. Active  No VB, LOF or UC's.  Phone #  [phone]  Preferred pharmacy confirmed.  C/o  Pelvic pressure   Davon Saleem

## 2021-04-05 NOTE — PROGRESS NOTES
S:  Pt is  at 34w2d for routine OB follow up.  No concerns todqay. No ED or hospital visits since last seen. Reports good FM.  Denies VB, LOF, RUCs or vaginal DC.    O:  Please see above vitals.        FHTs: 145        Fundal ht: 34 cm.        S=D    A:  IUP at 34w2d  Patient Active Problem List    Diagnosis Date Noted   • UTI on intake- needs FARNCES 2021   • Encounter for supervision of normal pregnancy in third trimester 2020        P:  1.  GBS @ 36 wks.          2.  Continue FKCs.          3.  Questions answered.          4.  Encouraged pt to tour L&D.          5.  Encourage adequate water intake.        6.  F/u 2 wks.

## 2021-04-13 ENCOUNTER — ROUTINE PRENATAL (OUTPATIENT)
Dept: OBGYN | Facility: CLINIC | Age: 26
End: 2021-04-13

## 2021-04-13 ENCOUNTER — HOSPITAL ENCOUNTER (OUTPATIENT)
Dept: LAB | Facility: MEDICAL CENTER | Age: 26
End: 2021-04-13
Attending: NURSE PRACTITIONER
Payer: COMMERCIAL

## 2021-04-13 VITALS — SYSTOLIC BLOOD PRESSURE: 114 MMHG | DIASTOLIC BLOOD PRESSURE: 70 MMHG | BODY MASS INDEX: 30.65 KG/M2 | WEIGHT: 167.6 LBS

## 2021-04-13 DIAGNOSIS — Z34.83 ENCOUNTER FOR SUPERVISION OF OTHER NORMAL PREGNANCY IN THIRD TRIMESTER: Primary | ICD-10-CM

## 2021-04-13 DIAGNOSIS — Z34.83 ENCOUNTER FOR SUPERVISION OF OTHER NORMAL PREGNANCY IN THIRD TRIMESTER: ICD-10-CM

## 2021-04-13 LAB
APPEARANCE UR: CLEAR
BACTERIA #/AREA URNS HPF: ABNORMAL /HPF
BILIRUB UR QL STRIP.AUTO: NEGATIVE
CAOX CRY #/AREA URNS HPF: ABNORMAL /HPF
COLOR UR: YELLOW
EPI CELLS #/AREA URNS HPF: ABNORMAL /HPF
GLUCOSE UR STRIP.AUTO-MCNC: NEGATIVE MG/DL
HYALINE CASTS #/AREA URNS LPF: ABNORMAL /LPF
KETONES UR STRIP.AUTO-MCNC: NEGATIVE MG/DL
LEUKOCYTE ESTERASE UR QL STRIP.AUTO: ABNORMAL
MICRO URNS: ABNORMAL
MUCOUS THREADS #/AREA URNS HPF: ABNORMAL /HPF
NITRITE UR QL STRIP.AUTO: NEGATIVE
PH UR STRIP.AUTO: 7 [PH] (ref 5–8)
PROT UR QL STRIP: NEGATIVE MG/DL
RBC # URNS HPF: ABNORMAL /HPF
RBC UR QL AUTO: NEGATIVE
SP GR UR STRIP.AUTO: 1.02
UROBILINOGEN UR STRIP.AUTO-MCNC: 0.2 MG/DL
WBC #/AREA URNS HPF: ABNORMAL /HPF

## 2021-04-13 PROCEDURE — 90040 PR PRENATAL FOLLOW UP: CPT | Performed by: NURSE PRACTITIONER

## 2021-04-13 RX ORDER — CEPHALEXIN 500 MG/1
500 CAPSULE ORAL 2 TIMES DAILY
Qty: 60 CAPSULE | Refills: 1 | Status: ON HOLD | OUTPATIENT
Start: 2021-04-13 | End: 2021-05-07

## 2021-04-13 ASSESSMENT — FIBROSIS 4 INDEX: FIB4 SCORE: 0.44

## 2021-04-13 NOTE — PROGRESS NOTES
OB follow up   + fetal movement. Active  No VB, LOF or UC's.  Phone #  [phone]  Preferred pharmacy confirmed.  C/o  Cramping here and there  TDAP Declined

## 2021-04-13 NOTE — PROGRESS NOTES
S:  Pt is  at 35w3d here for routine OB follow up.  Reports torsten-hernández CTX through the weekend. Currently taking Macrobid prophylaxis, will change to Keflex for remainder of pregnancy. Pt not sure if cramping is from pregnancy or beginnings of new UTI, will send for UA w/culture if indicated. Pt desires 39 week IOL. No ED or hospital visits since last seen. Reports good FM.  Denies VB, LOF, RUCs, or vaginal DC.     O:  Please see above vitals.        FHTs: 145        Fundal ht: 36 cm.        S=D        Fetal position: vertex.    A:  IUP at 35w3d  Patient Active Problem List    Diagnosis Date Noted   • UTI on intake- needs FRANCES 2021   • Encounter for supervision of normal pregnancy in third trimester 2020       P:  1.  Labor precautions given.  Instructions given on where to go.  Pt receptive to              education.          2.  Questions answered.          4.  Discussed with pt plan for GBS at 36 wks.        5.  Continue FKCs.          6.  Encouraged adequate water intake        7.  D/w pt walking 30 min daily, pelvic rocking, squats        8.  F/u in 1 week, will place referral for 39 wk IOL at that time         9.  Rx for Keflex       10. UA w culture if indicated

## 2021-04-22 ENCOUNTER — ROUTINE PRENATAL (OUTPATIENT)
Dept: OBGYN | Facility: CLINIC | Age: 26
End: 2021-04-22

## 2021-04-22 ENCOUNTER — HOSPITAL ENCOUNTER (OUTPATIENT)
Facility: MEDICAL CENTER | Age: 26
End: 2021-04-22
Attending: NURSE PRACTITIONER
Payer: COMMERCIAL

## 2021-04-22 VITALS — WEIGHT: 170 LBS | SYSTOLIC BLOOD PRESSURE: 110 MMHG | BODY MASS INDEX: 31.09 KG/M2 | DIASTOLIC BLOOD PRESSURE: 60 MMHG

## 2021-04-22 DIAGNOSIS — Z34.83 ENCOUNTER FOR SUPERVISION OF OTHER NORMAL PREGNANCY IN THIRD TRIMESTER: ICD-10-CM

## 2021-04-22 DIAGNOSIS — Z34.83 ENCOUNTER FOR SUPERVISION OF OTHER NORMAL PREGNANCY IN THIRD TRIMESTER: Primary | ICD-10-CM

## 2021-04-22 PROCEDURE — 90040 PR PRENATAL FOLLOW UP: CPT | Performed by: NURSE PRACTITIONER

## 2021-04-22 ASSESSMENT — FIBROSIS 4 INDEX: FIB4 SCORE: 0.44

## 2021-04-22 NOTE — PROGRESS NOTES
S:  Pt is  at 36w5d here for routine OB follow up.  Reports CTX that begun last night, irregular with increased dicharge.  No ED or hospital visits since last seen. Reports good FM.  Denies VB, LOF, RUCs, or vaginal DC.     O:  Please see above vitals.        FHTs: 140        Fundal ht: 36 cm        Fetal position: vertex        S=D        VE: 1/th/posterior and ballotable         Nitrazine and fern negative  A:  IUP at 36w5d  Patient Active Problem List    Diagnosis Date Noted   • UTI on intake- needs FRANCES 2021   • Encounter for supervision of normal pregnancy in third trimester 2020       P:  1.  Continue FKCs.         2.  Labor precautions given.  Instructions given on where to go.  Pt receptive to education.         3.  Questions answered.         4.  Encouraged adequate water intake, walking 30-60 minutes daily, bounce ball, pelvic rocking       5.  GBS today       6.  F/u 1wk         .

## 2021-04-22 NOTE — PROGRESS NOTES
OB follow up   + fetal movement. Active  No VB, LOF or UC's.  Phone # [phone]  Preferred pharmacy confirmed.  C/o  Leaking clear watery fluid

## 2021-04-24 LAB — GP B STREP DNA SPEC QL NAA+PROBE: POSITIVE

## 2021-04-26 ENCOUNTER — TELEPHONE (OUTPATIENT)
Dept: OBGYN | Facility: CLINIC | Age: 26
End: 2021-04-26

## 2021-04-26 PROBLEM — B95.1 GROUP BETA STREP POSITIVE: Status: ACTIVE | Noted: 2021-04-26

## 2021-04-26 NOTE — TELEPHONE ENCOUNTER
Pt called asking what is the next step since her GBS was positive. I let her know that we will be putting her on antibiotics as she is delivering. Pt understood and had no further questions.

## 2021-04-29 ENCOUNTER — ROUTINE PRENATAL (OUTPATIENT)
Dept: OBGYN | Facility: CLINIC | Age: 26
End: 2021-04-29

## 2021-04-29 VITALS — DIASTOLIC BLOOD PRESSURE: 60 MMHG | SYSTOLIC BLOOD PRESSURE: 106 MMHG | BODY MASS INDEX: 30.76 KG/M2 | WEIGHT: 168.2 LBS

## 2021-04-29 DIAGNOSIS — Z34.83 ENCOUNTER FOR SUPERVISION OF OTHER NORMAL PREGNANCY IN THIRD TRIMESTER: Primary | ICD-10-CM

## 2021-04-29 PROCEDURE — 90040 PR PRENATAL FOLLOW UP: CPT | Performed by: NURSE PRACTITIONER

## 2021-04-29 ASSESSMENT — FIBROSIS 4 INDEX: FIB4 SCORE: 0.44

## 2021-04-29 NOTE — PROGRESS NOTES
OB follow up   + fetal movement.  No VB, LOF or UC's.  Phone #   Preferred pharmacy confirmed.

## 2021-04-29 NOTE — PROGRESS NOTES
S:  Pt is  at 37w5d here for routine OB follow up.  Reports irreg CTX, increased pressure.  Says she was seen in hospital on  for CTX. Reports good FM.  Denies VB, LOF, RUCs, or vaginal DC.     O:  Please see above vitals.        FHTs: 140        Fundal ht: 38 cm        Fetal position: vertex        SVE: /-3        GBS positive  -- reviewed w pt.      A:  IUP at 37w5d  Patient Active Problem List    Diagnosis Date Noted   • Group beta Strep positive 2021   • UTI on intake- needs FRANCES 2021   • Encounter for supervision of normal pregnancy in third trimester 2020       P:  1.  Anticipatory guidance given         2.  Labor precautions given.  Instructions given on where to go.  Pt receptive to education.         3.  D/w pt IOL policy.  IOL referral sent.        4.  Questions answered.         5.  Encouraged adequate water intake, walking 30-60 min daily, pelvic rocking, birthing ball       6.  F/u 1wk

## 2021-04-29 NOTE — PROGRESS NOTES
Pt. Here for OB/FU. Reports Good DORA.   Good # [phone]  Pt. Denies VB, LOF, or UC's.   Pharmacy verified.   Chaperone offered and not indicated  Pt states that she has been getting a lot of contractions. She was seen at the hospital on 04/26/2021 for contractions.

## 2021-05-05 ENCOUNTER — HOSPITAL ENCOUNTER (INPATIENT)
Facility: MEDICAL CENTER | Age: 26
LOS: 2 days | End: 2021-05-07
Attending: OBSTETRICS & GYNECOLOGY | Admitting: OBSTETRICS & GYNECOLOGY
Payer: MEDICAID

## 2021-05-05 ENCOUNTER — ANESTHESIA EVENT (OUTPATIENT)
Dept: ANESTHESIOLOGY | Facility: MEDICAL CENTER | Age: 26
End: 2021-05-05
Payer: MEDICAID

## 2021-05-05 ENCOUNTER — ANESTHESIA (OUTPATIENT)
Dept: ANESTHESIOLOGY | Facility: MEDICAL CENTER | Age: 26
End: 2021-05-05
Payer: MEDICAID

## 2021-05-05 LAB
BASOPHILS # BLD AUTO: 0.2 % (ref 0–1.8)
BASOPHILS # BLD: 0.02 K/UL (ref 0–0.12)
EOSINOPHIL # BLD AUTO: 0.02 K/UL (ref 0–0.51)
EOSINOPHIL NFR BLD: 0.2 % (ref 0–6.9)
ERYTHROCYTE [DISTWIDTH] IN BLOOD BY AUTOMATED COUNT: 38.6 FL (ref 35.9–50)
HCT VFR BLD AUTO: 34.2 % (ref 37–47)
HGB BLD-MCNC: 10.9 G/DL (ref 12–16)
HOLDING TUBE BB 8507: NORMAL
IMM GRANULOCYTES # BLD AUTO: 0.07 K/UL (ref 0–0.11)
IMM GRANULOCYTES NFR BLD AUTO: 0.5 % (ref 0–0.9)
LYMPHOCYTES # BLD AUTO: 1.27 K/UL (ref 1–4.8)
LYMPHOCYTES NFR BLD: 9.8 % (ref 22–41)
MCH RBC QN AUTO: 25.2 PG (ref 27–33)
MCHC RBC AUTO-ENTMCNC: 31.9 G/DL (ref 33.6–35)
MCV RBC AUTO: 79.2 FL (ref 81.4–97.8)
MONOCYTES # BLD AUTO: 0.85 K/UL (ref 0–0.85)
MONOCYTES NFR BLD AUTO: 6.6 % (ref 0–13.4)
NEUTROPHILS # BLD AUTO: 10.69 K/UL (ref 2–7.15)
NEUTROPHILS NFR BLD: 82.7 % (ref 44–72)
NRBC # BLD AUTO: 0 K/UL
NRBC BLD-RTO: 0 /100 WBC
PLATELET # BLD AUTO: 328 K/UL (ref 164–446)
PMV BLD AUTO: 10.1 FL (ref 9–12.9)
RBC # BLD AUTO: 4.32 M/UL (ref 4.2–5.4)
SARS-COV+SARS-COV-2 AG RESP QL IA.RAPID: NOTDETECTED
SARS-COV-2 RNA RESP QL NAA+PROBE: NOTDETECTED
SPECIMEN SOURCE: NORMAL
SPECIMEN SOURCE: NORMAL
WBC # BLD AUTO: 12.9 K/UL (ref 4.8–10.8)

## 2021-05-05 PROCEDURE — 770002 HCHG ROOM/CARE - OB PRIVATE (112)

## 2021-05-05 PROCEDURE — 700105 HCHG RX REV CODE 258: Performed by: OBSTETRICS & GYNECOLOGY

## 2021-05-05 PROCEDURE — 302449 STATCHG TRIAGE ONLY (STATISTIC)

## 2021-05-05 PROCEDURE — 85025 COMPLETE CBC W/AUTO DIFF WBC: CPT

## 2021-05-05 PROCEDURE — 700111 HCHG RX REV CODE 636 W/ 250 OVERRIDE (IP)

## 2021-05-05 PROCEDURE — 87426 SARSCOV CORONAVIRUS AG IA: CPT

## 2021-05-05 PROCEDURE — 700111 HCHG RX REV CODE 636 W/ 250 OVERRIDE (IP): Performed by: OBSTETRICS & GYNECOLOGY

## 2021-05-05 PROCEDURE — 700111 HCHG RX REV CODE 636 W/ 250 OVERRIDE (IP): Performed by: ANESTHESIOLOGY

## 2021-05-05 PROCEDURE — 36415 COLL VENOUS BLD VENIPUNCTURE: CPT

## 2021-05-05 PROCEDURE — 700105 HCHG RX REV CODE 258: Performed by: ANESTHESIOLOGY

## 2021-05-05 PROCEDURE — 700101 HCHG RX REV CODE 250: Performed by: ANESTHESIOLOGY

## 2021-05-05 PROCEDURE — U0005 INFEC AGEN DETEC AMPLI PROBE: HCPCS

## 2021-05-05 PROCEDURE — U0003 INFECTIOUS AGENT DETECTION BY NUCLEIC ACID (DNA OR RNA); SEVERE ACUTE RESPIRATORY SYNDROME CORONAVIRUS 2 (SARS-COV-2) (CORONAVIRUS DISEASE [COVID-19]), AMPLIFIED PROBE TECHNIQUE, MAKING USE OF HIGH THROUGHPUT TECHNOLOGIES AS DESCRIBED BY CMS-2020-01-R: HCPCS

## 2021-05-05 RX ORDER — LIDOCAINE HYDROCHLORIDE AND EPINEPHRINE 15; 5 MG/ML; UG/ML
INJECTION, SOLUTION EPIDURAL
Status: COMPLETED | OUTPATIENT
Start: 2021-05-05 | End: 2021-05-05

## 2021-05-05 RX ORDER — ROPIVACAINE HYDROCHLORIDE 2 MG/ML
INJECTION, SOLUTION EPIDURAL; INFILTRATION; PERINEURAL CONTINUOUS
Status: DISCONTINUED | OUTPATIENT
Start: 2021-05-05 | End: 2021-05-07

## 2021-05-05 RX ORDER — BUPIVACAINE HYDROCHLORIDE 2.5 MG/ML
INJECTION, SOLUTION EPIDURAL; INFILTRATION; INTRACAUDAL
Status: COMPLETED | OUTPATIENT
Start: 2021-05-05 | End: 2021-05-05

## 2021-05-05 RX ORDER — ROPIVACAINE HYDROCHLORIDE 2 MG/ML
INJECTION, SOLUTION EPIDURAL; INFILTRATION; PERINEURAL
Status: COMPLETED
Start: 2021-05-05 | End: 2021-05-05

## 2021-05-05 RX ORDER — ONDANSETRON 2 MG/ML
INJECTION INTRAMUSCULAR; INTRAVENOUS
Status: COMPLETED
Start: 2021-05-05 | End: 2021-05-05

## 2021-05-05 RX ORDER — SODIUM CHLORIDE, SODIUM LACTATE, POTASSIUM CHLORIDE, AND CALCIUM CHLORIDE .6; .31; .03; .02 G/100ML; G/100ML; G/100ML; G/100ML
1000 INJECTION, SOLUTION INTRAVENOUS
Status: COMPLETED | OUTPATIENT
Start: 2021-05-05 | End: 2021-05-05

## 2021-05-05 RX ORDER — METHYLERGONOVINE MALEATE 0.2 MG/ML
0.2 INJECTION INTRAVENOUS
Status: COMPLETED | OUTPATIENT
Start: 2021-05-05 | End: 2021-05-06

## 2021-05-05 RX ORDER — SODIUM CHLORIDE, SODIUM LACTATE, POTASSIUM CHLORIDE, AND CALCIUM CHLORIDE .6; .31; .03; .02 G/100ML; G/100ML; G/100ML; G/100ML
250 INJECTION, SOLUTION INTRAVENOUS PRN
Status: DISCONTINUED | OUTPATIENT
Start: 2021-05-05 | End: 2021-05-06 | Stop reason: HOSPADM

## 2021-05-05 RX ADMIN — BUPIVACAINE HYDROCHLORIDE 6 ML: 2.5 INJECTION, SOLUTION EPIDURAL; INFILTRATION; INTRACAUDAL; PERINEURAL at 19:46

## 2021-05-05 RX ADMIN — ONDANSETRON 4 MG: 2 INJECTION INTRAMUSCULAR; INTRAVENOUS at 20:47

## 2021-05-05 RX ADMIN — SODIUM CHLORIDE 2.5 MILLION UNITS: 9 INJECTION, SOLUTION INTRAVENOUS at 23:01

## 2021-05-05 RX ADMIN — SODIUM CHLORIDE 2.5 MILLION UNITS: 9 INJECTION, SOLUTION INTRAVENOUS at 18:45

## 2021-05-05 RX ADMIN — ROPIVACAINE HYDROCHLORIDE: 2 INJECTION, SOLUTION EPIDURAL; INFILTRATION at 19:53

## 2021-05-05 RX ADMIN — LIDOCAINE HYDROCHLORIDE,EPINEPHRINE BITARTRATE 3 ML: 15; .005 INJECTION, SOLUTION EPIDURAL; INFILTRATION; INTRACAUDAL; PERINEURAL at 19:46

## 2021-05-05 RX ADMIN — OXYTOCIN 2 MILLI-UNITS/MIN: 10 INJECTION, SOLUTION INTRAMUSCULAR; INTRAVENOUS at 20:31

## 2021-05-05 RX ADMIN — SODIUM CHLORIDE, POTASSIUM CHLORIDE, SODIUM LACTATE AND CALCIUM CHLORIDE 1000 ML: 600; 310; 30; 20 INJECTION, SOLUTION INTRAVENOUS at 19:10

## 2021-05-05 RX ADMIN — SODIUM CHLORIDE 5 MILLION UNITS: 900 INJECTION INTRAVENOUS at 14:40

## 2021-05-05 RX ADMIN — ROPIVACAINE HYDROCHLORIDE: 2 INJECTION, SOLUTION EPIDURAL; INFILTRATION; PERINEURAL at 19:53

## 2021-05-05 ASSESSMENT — PATIENT HEALTH QUESTIONNAIRE - PHQ9
SUM OF ALL RESPONSES TO PHQ9 QUESTIONS 1 AND 2: 0
1. LITTLE INTEREST OR PLEASURE IN DOING THINGS: NOT AT ALL
2. FEELING DOWN, DEPRESSED, IRRITABLE, OR HOPELESS: NOT AT ALL
2. FEELING DOWN, DEPRESSED, IRRITABLE, OR HOPELESS: NOT AT ALL
SUM OF ALL RESPONSES TO PHQ9 QUESTIONS 1 AND 2: 0
1. LITTLE INTEREST OR PLEASURE IN DOING THINGS: NOT AT ALL

## 2021-05-05 ASSESSMENT — LIFESTYLE VARIABLES
HAVE YOU EVER FELT YOU SHOULD CUT DOWN ON YOUR DRINKING: NO
HAVE PEOPLE ANNOYED YOU BY CRITICIZING YOUR DRINKING: NO
CONSUMPTION TOTAL: NEGATIVE
AVERAGE NUMBER OF DAYS PER WEEK YOU HAVE A DRINK CONTAINING ALCOHOL: 0
EVER_SMOKED: NEVER
ON A TYPICAL DAY WHEN YOU DRINK ALCOHOL HOW MANY DRINKS DO YOU HAVE: 0
DOES PATIENT WANT TO STOP DRINKING: NO
EVER FELT BAD OR GUILTY ABOUT YOUR DRINKING: NO
TOTAL SCORE: 0
TOTAL SCORE: 0
ALCOHOL_USE: NO
TOTAL SCORE: 0
EVER HAD A DRINK FIRST THING IN THE MORNING TO STEADY YOUR NERVES TO GET RID OF A HANGOVER: NO
HOW MANY TIMES IN THE PAST YEAR HAVE YOU HAD 5 OR MORE DRINKS IN A DAY: 0

## 2021-05-05 ASSESSMENT — FIBROSIS 4 INDEX: FIB4 SCORE: 0.44

## 2021-05-05 ASSESSMENT — PAIN DESCRIPTION - PAIN TYPE
TYPE: ACUTE PAIN
TYPE: ACUTE PAIN

## 2021-05-05 NOTE — PROGRESS NOTES
[facility] - 5/15 EGA - 38.4    1212 - Pt arrived to labor and delivery for painful UCs. Pt placed in room S 219.  1227 - External monitors in place X2. Category I FHT at this time. VSS. Pt reports good FM. No complaints of ROM or vaginal bleeding. Thinks she lost her mucus plug a few days ago. States UCs have started this am at 0200 and have gotten progressively worse. SVE /-2 per CRIS Galdamez RN. POC discussed with pt and family members, all questions answered. Dr Jalloh updated, recheck in 1 hour  1415 - SVE 3/70/-2. Dr Jalloh updated, admission orders received. IV started, labs sent. Covid swab sent. POC discussed, with pt, questions answered.   1430 - Dr Jalloh at bedside.   1700 - SVE 2. Birthing ball provided.    - Report to MILO Alex RN

## 2021-05-05 NOTE — H&P
History and Physical    Gissell Pebbles Colon is a 25 y.o. female  at 38w4d who presents for labor    Subjective:   CTXs: positive   Pain: negative  LOF: negative  Vaginal bleeding: negative   Fetal movement: positive    ROS: Pertinent positives documented in HPI and all other systems reviewed & are negative    OB History    Para Term  AB Living   5 4 4 0   4   SAB TAB Ectopic Molar Multiple Live Births             4      # Outcome Date GA Lbr José Luis/2nd Weight Sex Delivery Anes PTL Lv   5 Current            4 Term 17 37w5d  2.755 kg (6 lb 1.2 oz) M Vag-Spont EPI N ENDY      Birth Comments: no complications   3 Term 16 39w5d  2.268 kg (5 lb) F Vag-Spont  N ENDY      Birth Comments: No complications   2 Term 13 38w1d  2.835 kg (6 lb 4 oz) M Vag-Spont EPI  ENDY      Birth Comments: System Generated. Please review and update pregnancy details.   1 Term 12 39w4d  3.118 kg (6 lb 14 oz) M Vag-Vacuum EPI N ENDY      Birth Comments: Pt states no complications       PGYNHx: denies fibroids, ovarian cysts     Past Medical History:   Diagnosis Date   • N&V - nausea and vomiting 2011   • UTI (urinary tract infection)     admitted for 6 days early        No past surgical history on file.      Current Facility-Administered Medications:   •  penicillin G potassium 5 Million Units in  mL IVPB, 5 Million Units, Intravenous, Once **FOLLOWED BY** penicillin G potassium 2.5 Million Units in  mL IVPB, 2.5 Million Units, Intravenous, Q4HRS, Darling Jalloh, D.O.  •  methylergonovine (METHERGINE) injection 0.2 mg, 0.2 mg, Intramuscular, Once PRN, Darling Jalloh, D.O.  •  oxytocin (PITOCIN) infusion (for induction), 0.5-20 beatriz-units/min, Intravenous, Continuous, Darling Jalloh, D.O.    Allergies: Nyquil    Social History     Socioeconomic History   • Marital status: Single     Spouse name: Not on file   • Number of children: Not on file   • Years of education: Not  "on file   • Highest education level: Not on file   Occupational History   • Not on file   Tobacco Use   • Smoking status: Never Smoker   • Smokeless tobacco: Never Used   Substance and Sexual Activity   • Alcohol use: Not Currently   • Drug use: No   • Sexual activity: Yes     Partners: Male   Other Topics Concern   • Not on file   Social History Narrative   • Not on file     Social Determinants of Health     Financial Resource Strain:    • Difficulty of Paying Living Expenses:    Food Insecurity:    • Worried About Running Out of Food in the Last Year:    • Ran Out of Food in the Last Year:    Transportation Needs:    • Lack of Transportation (Medical):    • Lack of Transportation (Non-Medical):    Physical Activity:    • Days of Exercise per Week:    • Minutes of Exercise per Session:    Stress:    • Feeling of Stress :    Social Connections:    • Frequency of Communication with Friends and Family:    • Frequency of Social Gatherings with Friends and Family:    • Attends Judaism Services:    • Active Member of Clubs or Organizations:    • Attends Club or Organization Meetings:    • Marital Status:    Intimate Partner Violence:    • Fear of Current or Ex-Partner:    • Emotionally Abused:    • Physically Abused:    • Sexually Abused:          FamHx:   Non contributory     Prenatal care with following problems:  Patient Active Problem List    Diagnosis Date Noted   • Group beta Strep positive 04/26/2021   • UTI on intake- needs FRANCES 01/27/2021   • Encounter for supervision of normal pregnancy in third trimester 12/18/2020         Objective:      /64   Pulse 96   Temp 37.4 °C (99.4 °F) (Temporal)   Resp 20   Ht 1.577 m (5' 2.09\")   Wt 77 kg (169 lb 12.1 oz)     General:   no acute distress, alert, cooperative   Skin:   normal   HEENT:  EOMI   Lungs:   CTA bilateral   Heart:   chest is clear without rales or wheezing, no pedal edema, S1, S2 normal, no murmur, regular rate and rhythm   Abdomen:   soft, " gravid, NT   EFW:  S=D   Pelvis:  adequate with gynecoid pelvis   FHT:  150 BPM  Accels +  Decels -   Variability moderate  Category I   Uterine Size: S=D   Presentations: Cephalic   Cervix:     Dilation: 3cm    Effacement: 75%    Station:  -3    Consistency: Soft    Position: Middle     Lab Review  Lab:   Blood type: O     Recent Results (from the past 5880 hour(s))   POCT Urinalysis    Collection Time: 12/18/20  2:14 PM   Result Value Ref Range    POC Color yellow Negative    POC Appearance cloudy Negative    POC Leukocyte Esterase moderate Negative    POC Nitrites negative Negative    POC Urobiligen      POC Protein trace Negative mg/dL    POC Urine PH 7.0 5.0 - 8.0    POC Blood negative Negative    POC Specific Gravity 1.020 <1.005 - >1.030    POC Ketones negative Negative mg/dL    POC Bilirubin      POC Glucose negative Negative mg/dL   THINPREP RFLX HPV ASCUS W/CTNG    Collection Time: 12/18/20  3:00 PM   Result Value Ref Range    Cytology Reg See Path Report     C. trachomatis by PCR Negative Negative    N. gonorrhoeae by PCR Negative Negative    Source Cervical    URINALYSIS,CULTURE IF INDICATED    Collection Time: 01/26/21  5:37 PM    Specimen: Urine   Result Value Ref Range    Color Yellow     Character Clear     Specific Gravity 1.022 <1.035    Ph 6.5 5.0 - 8.0    Glucose Negative Negative mg/dL    Ketones Negative Negative mg/dL    Protein Negative Negative mg/dL    Bilirubin Negative Negative    Urobilinogen, Urine 0.2 Negative    Nitrite Positive (A) Negative    Leukocyte Esterase Small (A) Negative    Occult Blood Negative Negative    Micro Urine Req Microscopic    URINE DRUG SCREEN W/CONF (AR)    Collection Time: 01/26/21  5:37 PM   Result Value Ref Range    Urine Amphetamine-Methamphetam Negative Cutoff 300 ng/mL    Barbiturates Negative Cutoff 200 ng/mL    Benzodiazepines Negative Cutoff 200 ng/mL    Propoxyphene Negative Cutoff 300 ng/mL    Cocaine Metabolite Negative Cutoff 150 ng/mL    Methadone  Negative Cutoff 150 ng/mL    Codeine-Morphine Negative Cutoff 300 ng/mL    Phencyclidine -Pcp Negative Cutoff 25 ng/mL    Cannabinoid Metab Negative Cutoff 20 ng/mL    Drug Comment Urine Drugs See Note    PRENATAL PANEL 3+HIV+HCV    Collection Time: 01/26/21  5:37 PM   Result Value Ref Range    WBC 13.7 (H) 4.8 - 10.8 K/uL    RBC 4.12 (L) 4.20 - 5.40 M/uL    Hemoglobin 12.3 12.0 - 16.0 g/dL    Hematocrit 36.0 (L) 37.0 - 47.0 %    MCV 87.4 81.4 - 97.8 fL    MCH 29.9 27.0 - 33.0 pg    MCHC 34.2 33.6 - 35.0 g/dL    RDW 40.1 35.9 - 50.0 fL    Platelet Count 265 164 - 446 K/uL    MPV 10.6 9.0 - 12.9 fL    Neutrophils-Polys 74.60 (H) 44.00 - 72.00 %    Lymphocytes 16.00 (L) 22.00 - 41.00 %    Monocytes 7.40 0.00 - 13.40 %    Eosinophils 1.00 0.00 - 6.90 %    Basophils 0.30 0.00 - 1.80 %    Immature Granulocytes 0.70 0.00 - 0.90 %    Nucleated RBC 0.00 /100 WBC    Neutrophils (Absolute) 10.25 (H) 2.00 - 7.15 K/uL    Lymphs (Absolute) 2.20 1.00 - 4.80 K/uL    Monos (Absolute) 1.01 (H) 0.00 - 0.85 K/uL    Eos (Absolute) 0.14 0.00 - 0.51 K/uL    Baso (Absolute) 0.04 0.00 - 0.12 K/uL    Immature Granulocytes (abs) 0.10 0.00 - 0.11 K/uL    NRBC (Absolute) 0.00 K/uL    Rubella IgG Antibody 55.80 IU/mL    Hepatitis B Surface Antigen Non-Reactive Non-Reactive    Hepatitis C Antibody Non-Reactive Non-Reactive    Syphilis, Treponemal Qual Non-Reactive Non-Reactive   HIV AG/AB COMBO ASSAY SCREENING    Collection Time: 01/26/21  5:37 PM   Result Value Ref Range    HIV Ag/Ab Combo Assay Non-Reactive Non Reactive   OP Prenatal Panel-Blood Bank    Collection Time: 01/26/21  5:37 PM   Result Value Ref Range    ABO Grouping Only O     Rh Grouping Only POS     Antibody Screen Scrn NEG    URINE MICROSCOPIC (W/UA)    Collection Time: 01/26/21  5:37 PM   Result Value Ref Range    WBC 20-50 (A) /hpf    RBC 0-2 /hpf    Bacteria Many (A) None /hpf    Epithelial Cells Few /hpf    Mucous Threads Few /hpf   URINE CULTURE(NEW)    Collection Time:  01/26/21  5:37 PM    Specimen: Urine   Result Value Ref Range    Significant Indicator POS (POS)     Source UR     Site -     Culture Result - (A)     Culture Result Escherichia coli  >100,000 cfu/mL   (A)        Susceptibility    Escherichia coli - WILLOW     Ampicillin <=8 Sensitive mcg/mL     Ceftriaxone <=1 Sensitive mcg/mL     Ceftazidime <=1 Sensitive mcg/mL     Cefotaxime <=2 Sensitive mcg/mL     Cefazolin <=2 Sensitive mcg/mL     Ciprofloxacin <=0.25 Sensitive mcg/mL     Cefepime <=2 Sensitive mcg/mL     Cefuroxime <=4 Sensitive mcg/mL     Ampicillin/sulbactam <=4/2 Sensitive mcg/mL     Tobramycin <=2 Sensitive mcg/mL     Nitrofurantoin <=32 Sensitive mcg/mL     Gentamicin <=2 Sensitive mcg/mL     Levofloxacin <=0.5 Sensitive mcg/mL     Pip/Tazobactam <=8 Sensitive mcg/mL     Trimeth/Sulfa <=0.5/9.5 Sensitive mcg/mL     Tigecycline <=2 Sensitive mcg/mL   CBC WITH DIFFERENTIAL    Collection Time: 02/08/21  8:12 PM   Result Value Ref Range    WBC 13.9 (H) 4.8 - 10.8 K/uL    RBC 3.94 (L) 4.20 - 5.40 M/uL    Hemoglobin 11.7 (L) 12.0 - 16.0 g/dL    Hematocrit 34.6 (L) 37.0 - 47.0 %    MCV 87.8 81.4 - 97.8 fL    MCH 29.7 27.0 - 33.0 pg    MCHC 33.8 33.6 - 35.0 g/dL    RDW 39.8 35.9 - 50.0 fL    Platelet Count 259 164 - 446 K/uL    MPV 10.3 9.0 - 12.9 fL    Neutrophils-Polys 74.60 (H) 44.00 - 72.00 %    Lymphocytes 16.70 (L) 22.00 - 41.00 %    Monocytes 7.40 0.00 - 13.40 %    Eosinophils 0.70 0.00 - 6.90 %    Basophils 0.20 0.00 - 1.80 %    Immature Granulocytes 0.40 0.00 - 0.90 %    Nucleated RBC 0.00 /100 WBC    Neutrophils (Absolute) 10.34 (H) 2.00 - 7.15 K/uL    Lymphs (Absolute) 2.31 1.00 - 4.80 K/uL    Monos (Absolute) 1.03 (H) 0.00 - 0.85 K/uL    Eos (Absolute) 0.10 0.00 - 0.51 K/uL    Baso (Absolute) 0.03 0.00 - 0.12 K/uL    Immature Granulocytes (abs) 0.06 0.00 - 0.11 K/uL    NRBC (Absolute) 0.00 K/uL   Comp Metabolic Panel    Collection Time: 02/08/21  8:12 PM   Result Value Ref Range    Sodium 137 135 - 145  mmol/L    Potassium 3.7 3.6 - 5.5 mmol/L    Chloride 104 96 - 112 mmol/L    Co2 20 20 - 33 mmol/L    Anion Gap 13.0 7.0 - 16.0    Glucose 86 65 - 99 mg/dL    Bun 6 (L) 8 - 22 mg/dL    Creatinine 0.45 (L) 0.50 - 1.40 mg/dL    Calcium 8.4 (L) 8.5 - 10.5 mg/dL    AST(SGOT) 17 12 - 45 U/L    ALT(SGPT) 15 2 - 50 U/L    Alkaline Phosphatase 80 30 - 99 U/L    Total Bilirubin <0.2 0.1 - 1.5 mg/dL    Albumin 3.4 3.2 - 4.9 g/dL    Total Protein 6.4 6.0 - 8.2 g/dL    Globulin 3.0 1.9 - 3.5 g/dL    A-G Ratio 1.1 g/dL   ESTIMATED GFR    Collection Time: 02/08/21  8:12 PM   Result Value Ref Range    GFR If African American >60 >60 mL/min/1.73 m 2    GFR If Non African American >60 >60 mL/min/1.73 m 2   POC UA    Collection Time: 02/08/21  8:13 PM   Result Value Ref Range    POC Color Yellow     POC Appearance Cloudy (A)     POC Glucose Negative Negative mg/dL    POC Ketones Negative Negative mg/dL    POC Specific Gravity 1.025 1.005 - 1.030    POC Blood Trace-intact (A) Negative    POC Urine PH 6.0 5.0 - 8.0    POC Protein Trace (A) Negative mg/dL    POC Nitrites Negative Negative    POC Leukocyte Esterase Trace (A) Negative   POCT Urinalysis    Collection Time: 02/24/21 11:00 AM   Result Value Ref Range    POC Color yellow Negative    POC Appearance cloudy Negative    POC Leukocyte Esterase small Negative    POC Nitrites Postive Negative    POC Urobiligen      POC Protein negative Negative mg/dL    POC Urine PH 7.0 5.0 - 8.0    POC Blood trace Negative    POC Specific Gravity 1.020 <1.005 - >1.030    POC Ketones negative Negative mg/dL    POC Bilirubin      POC Glucose negative Negative mg/dL   GLUCOSE 1HR GESTATIONAL    Collection Time: 03/13/21  9:07 AM   Result Value Ref Range    Glucose, Post Dose 132 70 - 139 mg/dL   URINALYSIS,CULTURE IF INDICATED    Collection Time: 04/13/21 11:02 AM    Specimen: Urine   Result Value Ref Range    Color Yellow     Character Clear     Specific Gravity 1.019 <1.035    Ph 7.0 5.0 - 8.0     Glucose Negative Negative mg/dL    Ketones Negative Negative mg/dL    Protein Negative Negative mg/dL    Bilirubin Negative Negative    Urobilinogen, Urine 0.2 Negative    Nitrite Negative Negative    Leukocyte Esterase Small (A) Negative    Occult Blood Negative Negative    Micro Urine Req Microscopic    URINE MICROSCOPIC (W/UA)    Collection Time: 04/13/21 11:02 AM   Result Value Ref Range    WBC 0-2 /hpf    RBC 0-2 /hpf    Bacteria Few (A) None /hpf    Epithelial Cells Few /hpf    Mucous Threads Few /hpf    Ca Oxalate Crystal Few /hpf    Hyaline Cast 0-2 /lpf   GRP B STREP, BY PCR (VARGAS BROTH)    Collection Time: 04/22/21 11:02 AM    Specimen: Genital   Result Value Ref Range    Strep Gp B DNA PCR POSITIVE (A) Negative        Assessment:   Gissell Colon at 38w4d  Labor status: Early latent labor.  GBS positive    Obstetrical history significant for   Patient Active Problem List    Diagnosis Date Noted   • Group beta Strep positive 04/26/2021   • UTI on intake- needs FRANCES 01/27/2021   • Encounter for supervision of normal pregnancy in third trimester 12/18/2020   .      Plan:     Admit to L&D  GBS positive start pcn  After 4 hours of pcn, if pt not in active labor, will break water to augment  Pitocin prn   Fetal monitoring/toco    Darling Jalloh D.O.

## 2021-05-06 LAB
ERYTHROCYTE [DISTWIDTH] IN BLOOD BY AUTOMATED COUNT: 38.4 FL (ref 35.9–50)
HCT VFR BLD AUTO: 28.7 % (ref 37–47)
HGB BLD-MCNC: 9.1 G/DL (ref 12–16)
MCH RBC QN AUTO: 25.1 PG (ref 27–33)
MCHC RBC AUTO-ENTMCNC: 31.7 G/DL (ref 33.6–35)
MCV RBC AUTO: 79.3 FL (ref 81.4–97.8)
PLATELET # BLD AUTO: 293 K/UL (ref 164–446)
PMV BLD AUTO: 10.3 FL (ref 9–12.9)
RBC # BLD AUTO: 3.62 M/UL (ref 4.2–5.4)
WBC # BLD AUTO: 15.4 K/UL (ref 4.8–10.8)

## 2021-05-06 PROCEDURE — 59409 OBSTETRICAL CARE: CPT

## 2021-05-06 PROCEDURE — A9270 NON-COVERED ITEM OR SERVICE: HCPCS | Performed by: OBSTETRICS & GYNECOLOGY

## 2021-05-06 PROCEDURE — 700111 HCHG RX REV CODE 636 W/ 250 OVERRIDE (IP): Performed by: OBSTETRICS & GYNECOLOGY

## 2021-05-06 PROCEDURE — A9270 NON-COVERED ITEM OR SERVICE: HCPCS

## 2021-05-06 PROCEDURE — 770002 HCHG ROOM/CARE - OB PRIVATE (112)

## 2021-05-06 PROCEDURE — 700102 HCHG RX REV CODE 250 W/ 637 OVERRIDE(OP): Performed by: OBSTETRICS & GYNECOLOGY

## 2021-05-06 PROCEDURE — 700102 HCHG RX REV CODE 250 W/ 637 OVERRIDE(OP)

## 2021-05-06 PROCEDURE — 304965 HCHG RECOVERY SERVICES

## 2021-05-06 PROCEDURE — 36415 COLL VENOUS BLD VENIPUNCTURE: CPT

## 2021-05-06 PROCEDURE — 10907ZC DRAINAGE OF AMNIOTIC FLUID, THERAPEUTIC FROM PRODUCTS OF CONCEPTION, VIA NATURAL OR ARTIFICIAL OPENING: ICD-10-PCS | Performed by: OBSTETRICS & GYNECOLOGY

## 2021-05-06 PROCEDURE — 700111 HCHG RX REV CODE 636 W/ 250 OVERRIDE (IP): Performed by: NURSE PRACTITIONER

## 2021-05-06 PROCEDURE — 303615 HCHG EPIDURAL/SPINAL ANESTHESIA FOR LABOR

## 2021-05-06 PROCEDURE — 85027 COMPLETE CBC AUTOMATED: CPT

## 2021-05-06 PROCEDURE — 59409 OBSTETRICAL CARE: CPT | Performed by: NURSE PRACTITIONER

## 2021-05-06 RX ORDER — IBUPROFEN 800 MG/1
800 TABLET ORAL EVERY 8 HOURS PRN
Status: DISCONTINUED | OUTPATIENT
Start: 2021-05-06 | End: 2021-05-07 | Stop reason: HOSPADM

## 2021-05-06 RX ORDER — METHYLERGONOVINE MALEATE 0.2 MG/ML
0.2 INJECTION INTRAVENOUS
Status: DISCONTINUED | OUTPATIENT
Start: 2021-05-06 | End: 2021-05-07 | Stop reason: HOSPADM

## 2021-05-06 RX ORDER — MISOPROSTOL 200 UG/1
600 TABLET ORAL
Status: DISCONTINUED | OUTPATIENT
Start: 2021-05-06 | End: 2021-05-07 | Stop reason: HOSPADM

## 2021-05-06 RX ORDER — CARBOPROST TROMETHAMINE 250 UG/ML
250 INJECTION, SOLUTION INTRAMUSCULAR
Status: DISCONTINUED | OUTPATIENT
Start: 2021-05-06 | End: 2021-05-07 | Stop reason: HOSPADM

## 2021-05-06 RX ORDER — DOCUSATE SODIUM 100 MG/1
100 CAPSULE, LIQUID FILLED ORAL 2 TIMES DAILY PRN
Status: DISCONTINUED | OUTPATIENT
Start: 2021-05-06 | End: 2021-05-07 | Stop reason: HOSPADM

## 2021-05-06 RX ORDER — SODIUM CHLORIDE, SODIUM LACTATE, POTASSIUM CHLORIDE, CALCIUM CHLORIDE 600; 310; 30; 20 MG/100ML; MG/100ML; MG/100ML; MG/100ML
INJECTION, SOLUTION INTRAVENOUS PRN
Status: DISCONTINUED | OUTPATIENT
Start: 2021-05-06 | End: 2021-05-07 | Stop reason: HOSPADM

## 2021-05-06 RX ORDER — MISOPROSTOL 200 UG/1
TABLET ORAL
Status: COMPLETED
Start: 2021-05-06 | End: 2021-05-06

## 2021-05-06 RX ORDER — ACETAMINOPHEN 500 MG
1000 TABLET ORAL EVERY 6 HOURS PRN
Status: DISCONTINUED | OUTPATIENT
Start: 2021-05-06 | End: 2021-05-07 | Stop reason: HOSPADM

## 2021-05-06 RX ADMIN — MISOPROSTOL 1000 MCG: 200 TABLET ORAL at 02:45

## 2021-05-06 RX ADMIN — IBUPROFEN 800 MG: 800 TABLET, FILM COATED ORAL at 08:51

## 2021-05-06 RX ADMIN — OXYTOCIN 125 ML/HR: 10 INJECTION, SOLUTION INTRAMUSCULAR; INTRAVENOUS at 03:46

## 2021-05-06 RX ADMIN — ACETAMINOPHEN 1000 MG: 500 TABLET ORAL at 20:04

## 2021-05-06 RX ADMIN — OXYTOCIN 999 ML/HR: 10 INJECTION, SOLUTION INTRAMUSCULAR; INTRAVENOUS at 02:40

## 2021-05-06 RX ADMIN — METHYLERGONOVINE MALEATE 0.2 MG: 0.2 INJECTION, SOLUTION INTRAMUSCULAR; INTRAVENOUS at 02:45

## 2021-05-06 ASSESSMENT — EDINBURGH POSTNATAL DEPRESSION SCALE (EPDS)
I HAVE BEEN SO UNHAPPY THAT I HAVE HAD DIFFICULTY SLEEPING: NOT AT ALL
I HAVE BLAMED MYSELF UNNECESSARILY WHEN THINGS WENT WRONG: NOT VERY OFTEN
I HAVE LOOKED FORWARD WITH ENJOYMENT TO THINGS: AS MUCH AS I EVER DID
I HAVE BEEN ANXIOUS OR WORRIED FOR NO GOOD REASON: NO, NOT AT ALL
I HAVE FELT SAD OR MISERABLE: NO, NOT AT ALL
I HAVE BEEN ABLE TO LAUGH AND SEE THE FUNNY SIDE OF THINGS: AS MUCH AS I ALWAYS COULD
THE THOUGHT OF HARMING MYSELF HAS OCCURRED TO ME: NEVER
I HAVE FELT SCARED OR PANICKY FOR NO GOOD REASON: NO, NOT AT ALL
THINGS HAVE BEEN GETTING ON TOP OF ME: NO, MOST OF THE TIME I HAVE COPED QUITE WELL
I HAVE BEEN SO UNHAPPY THAT I HAVE BEEN CRYING: NO, NEVER

## 2021-05-06 ASSESSMENT — PAIN DESCRIPTION - PAIN TYPE
TYPE: ACUTE PAIN
TYPE: ACUTE PAIN

## 2021-05-06 ASSESSMENT — PAIN SCALES - GENERAL: PAIN_LEVEL: 0

## 2021-05-06 NOTE — PROCEDURES
SPONTANEOUS VAGINAL DELIVERY PROCEDURE NOTE    PATIENT ID:  NAME:  Gissell Colon  MRN:               [id number]  YOB: 1995    Labor Course: Patient was admitted to Labor and Delivery at 38w5d for active labor.    Patient recd PCN x 2 doses prior to AROM and pitocin augmentation.Pt recd an epidural for pain. Labor progressed normally to complete with effective pushing effoerts    On 2021  at 02:37, this 25 y.o., now  38w5d , GBS posive female delivered via  under epidural anesthesia a viable male infant weight pending transition with APGAR scores of 8 and 9 at one and five minutes. The head delivered in MENG with rotation to LOT. There was a single nuchal cord which was reduced and anterior shoulder and rest of body delivered uneventfully. After 60 seconds, the cord was doubly clamped, cut and infant positioned to mothers chest. An intact placenta was delivered spontaneously at 02:40 with 3 vessel cord. Upon vaginal exam, there was an intact perineum. Pt continued to bleed heavily despite vigorous fundal massage, Cytotec 1000 mcg rectally and 0.2 mg methergine IM was given. Estimated blood loss was 650cc. Patient will be transferred to postpartum in stable condition and infant to  nursery.      Delivery attended by Catalina Alan CNM, APRN.

## 2021-05-06 NOTE — PROGRESS NOTES
0830 Patient arrived from L&D. Assumed care of patient, assessment completed. Fundus firm , lochia scant . Requests pain meds at this time. Instructed to call for assistance, pain medication, or with any questions. No further needs identified at this time.

## 2021-05-06 NOTE — CARE PLAN
Problem: Infection  Goal: Will remain free from infection  Outcome: PROGRESSING AS EXPECTED  Note: Pt remains free from s/s of infection, VSS     Problem: Knowledge Deficit  Goal: Knowledge of disease process/condition, treatment plan, diagnostic tests, and medications will improve  Outcome: PROGRESSING AS EXPECTED  Note: POC discussed, questions answered.

## 2021-05-06 NOTE — CARE PLAN
Pt family encouraged to report any spills, loose cords. TOCO and EFM are on pt to monitor baby. Pt understands to not get out of bed with epidural in place. IV fluids running as ordered. Educated on use of call light. Call light available at bedside.    Problem: Safety  Goal: Will remain free from injury  Outcome: PROGRESSING AS EXPECTED     Pt educated on pain management options. Understands the use of both pharmacological and non-pharmacological pain interventions. Pain assessments continuously implemented. Patient is comfortable with an epidural  Problem: Pain Management  Goal: Pain level will decrease to patient's comfort goal  Outcome: PROGRESSING AS EXPECTED

## 2021-05-06 NOTE — ANESTHESIA TIME REPORT
Anesthesia Start and Stop Event Times     Date Time Event    5/5/2021 1943 Ready for Procedure     1943 Anesthesia Start    5/6/2021 0237 Anesthesia Stop        Responsible Staff  05/05/21 to 05/06/21    Name Role Begin End    Yuval Mays M.D. Anesth 1943 0237        Preop Diagnosis (Free Text):  Pre-op Diagnosis             Preop Diagnosis (Codes):    Post op Diagnosis  Pregnancy      Premium Reason  A. 3PM - 7AM    Comments:

## 2021-05-06 NOTE — ANESTHESIA POSTPROCEDURE EVALUATION
Patient: Gissell Colon    Procedure Summary     Date: 05/05/21 Room / Location:     Anesthesia Start: 1943 Anesthesia Stop: 05/06/21 0237    Procedure: Labor Epidural Diagnosis:     Scheduled Providers:  Responsible Provider: Yuval Mays M.D.    Anesthesia Type: epidural ASA Status: 2          Final Anesthesia Type: epidural  Last vitals  BP   Blood Pressure: 127/69    Temp   36.4 °C (97.6 °F)    Pulse   95   Resp   20    SpO2   96 %      Anesthesia Post Evaluation    Patient location during evaluation: PACU  Patient participation: complete - patient participated  Level of consciousness: awake and alert  Pain score: 0    Airway patency: patent  Anesthetic complications: no  Cardiovascular status: hemodynamically stable  Respiratory status: acceptable  Hydration status: euvolemic    PONV: none          No complications documented.     Nurse Pain Score: 0 (NPRS)

## 2021-05-06 NOTE — ANESTHESIA PROCEDURE NOTES
Epidural Block    Date/Time: 5/5/2021 7:46 PM  Performed by: Yuval Mays M.D.  Authorized by: Yuval Mays M.D.     Patient Location:  OB  Start Time:  5/5/2021 7:46 PM  Reason for Block: labor analgesia    patient identified, IV checked, site marked, risks and benefits discussed, surgical consent, monitors and equipment checked, pre-op evaluation and timeout performed    Patient Position:  Sitting  Prep: ChloraPrep, patient draped and sterile technique    Monitoring:  Blood pressure, continuous pulse oximetry and heart rate  Approach:  Midline  Location:  L3-L4  Injection Technique:  MILLER saline  Skin infiltration:  Lidocaine  Strength:  1%  Dose:  3ml  Needle Type:  Tuohy  Needle Gauge:  17 G  Needle Length:  3.5 in  Loss of resistance::  5  Catheter Size:  19 G  Catheter at Skin Depth:  12  Test Dose Result:  Negative

## 2021-05-06 NOTE — PROGRESS NOTES
1900- Report received from MERARI Richardson RN, POC discussed. Pt uncomfortable with UC's at this time. Discussed pain management options. Pt desiring an epidural at this time before AROM performed. Questions answered. IV fluids running.   0130- R Waqas ALVAREZ at bedside for status update. SVE by CNJOJO, see flowsheets. Pt repositioned in throne position. Will recheck cervix when pt begins to feel more pressure. Call light within reach. Will continue to closely monitor.   0237-  of viable female infant. APGARS 8/9. See delivery summary.   0310- Fundal rub notes urine, straight cath provided. 50 cc output.   0530- Pt reporting urge to use restroom. Pt reports feeling unable to get up. Bed pan provided. Positive void.   0710- Report given to Shona RN, POC discussed. Questions answered.

## 2021-05-06 NOTE — ANESTHESIA PREPROCEDURE EVALUATION
in active labor. Kerr. Full term. Painful contractions.     Epidurals in past without issues.     Relevant Problems   No relevant active problems       Physical Exam    Airway   Mallampati: II  TM distance: >3 FB  Neck ROM: full       Cardiovascular - normal exam  Rhythm: regular  Rate: normal  (-) murmur     Dental - normal exam           Pulmonary - normal exam  Breath sounds clear to auscultation     Abdominal    Neurological - normal exam                 Anesthesia Plan    ASA 2       Plan - epidural   Neuraxial block will be labor analgesia                  Pertinent diagnostic labs and testing reviewed    Informed Consent:    Anesthetic plan and risks discussed with patient.

## 2021-05-07 ENCOUNTER — PHARMACY VISIT (OUTPATIENT)
Dept: PHARMACY | Facility: MEDICAL CENTER | Age: 26
End: 2021-05-07
Payer: COMMERCIAL

## 2021-05-07 VITALS
WEIGHT: 169.75 LBS | OXYGEN SATURATION: 97 % | HEART RATE: 76 BPM | TEMPERATURE: 97.9 F | BODY MASS INDEX: 31.24 KG/M2 | HEIGHT: 62 IN | DIASTOLIC BLOOD PRESSURE: 72 MMHG | RESPIRATION RATE: 18 BRPM | SYSTOLIC BLOOD PRESSURE: 118 MMHG

## 2021-05-07 PROCEDURE — RXMED WILLOW AMBULATORY MEDICATION CHARGE: Performed by: NURSE PRACTITIONER

## 2021-05-07 PROCEDURE — A9270 NON-COVERED ITEM OR SERVICE: HCPCS | Performed by: OBSTETRICS & GYNECOLOGY

## 2021-05-07 PROCEDURE — 700102 HCHG RX REV CODE 250 W/ 637 OVERRIDE(OP): Performed by: OBSTETRICS & GYNECOLOGY

## 2021-05-07 RX ORDER — FERROUS SULFATE 325(65) MG
325 TABLET ORAL DAILY
Qty: 30 TABLET | Refills: 0 | Status: SHIPPED | OUTPATIENT
Start: 2021-05-07

## 2021-05-07 RX ORDER — IBUPROFEN 200 MG
800 TABLET ORAL EVERY 8 HOURS PRN
Qty: 120 TABLET | Refills: 1 | Status: SHIPPED | OUTPATIENT
Start: 2021-05-07

## 2021-05-07 RX ADMIN — IBUPROFEN 800 MG: 800 TABLET, FILM COATED ORAL at 04:27

## 2021-05-07 ASSESSMENT — PAIN DESCRIPTION - PAIN TYPE: TYPE: ACUTE PAIN

## 2021-05-07 NOTE — CARE PLAN
Problem: Potential for postpartum infection related to presence of episiotomy/vaginal tear and/or uterine contamination  Goal: Patient will be absent from signs and symptoms of infection  Outcome: PROGRESSING AS EXPECTED  Note: VS stable. No sign of infection.     Problem: Alteration in comfort related to episiotomy, vaginal repair and/or after birth pains  Goal: Patient is able to ambulate, care for self and infant  Outcome: PROGRESSING AS EXPECTED  Note: VS stable. Patient denies pain at this time. Able to ambulate, care fore self and infant.

## 2021-05-07 NOTE — DISCHARGE PLANNING
Meds-to-Beds: Discharge prescription orders listed below delivered to patient's bedside. RN notified. Patient counseled.     Ibuprofen 200 mg dispensed with directions to take 4 tablets (800 mg) every 8 hours as needed due to patient coverage.       Gissell Medina   Home Medication Instructions MICHAEL:[id number]    Printed on:05/07/21 1013   Medication Information                      ferrous sulfate 325 (65 Fe) MG tablet  Take 1 tablet by mouth every day.             ibuprofen (MOTRIN) 200 MG Tab  Take 4 Tablets by mouth every 8 hours as needed (For cramping after delivery).               Helga Pa, PharmD

## 2021-05-07 NOTE — CARE PLAN
Problem: Altered physiologic condition related to immediate post-delivery state and potential for bleeding/hemorrhage  Goal: Patient physiologically stable as evidenced by normal lochia, palpable uterine involution and vital signs within normal limits  Outcome: PROGRESSING AS EXPECTED  Note: Fundus firm at U, lochia rubra minimal. VSS     Problem: Potential knowledge deficit related to lack of understanding of self and  care  Goal: Patient will verbalize understanding of self and infant care  Outcome: PROGRESSING AS EXPECTED  Note: Patient verbalized acceptable pain level at this time. Will be medicated as needed.

## 2021-05-07 NOTE — PROGRESS NOTES
0800 Assessment completed. Lochia light, fundus firm. Plan of care reviewed. Declines pain intervention at this time, will call if pain intervention needed.   1130 Discharge instructions and education reviewed with patient, verbalized understanding, papers signed.  1220 Patient left facility in wheelchair, escorted by staff.

## 2021-05-07 NOTE — DISCHARGE SUMMARY
Discharge Summary:      Gissell Colon    Admit Date:   2021  Discharge Date:  2021     Admitting diagnosis:  Normal pregnancy in multigravida in third trimester [Z34.83]  Discharge Diagnosis: Status post vaginal, spontaneous.  Pregnancy Complications: none  Tubal Ligation:  no        History:  Past Medical History:   Diagnosis Date   • N&V - nausea and vomiting 2011   • UTI (urinary tract infection)     admitted for 6 days early      OB History    Para Term  AB Living   5 5 5 0   5   SAB TAB Ectopic Molar Multiple Live Births           0 5      # Outcome Date GA Lbr José Luis/2nd Weight Sex Delivery Anes PTL Lv   5 Term 21 38w5d / 01:07 3.56 kg (7 lb 13.6 oz) M Vag-Spont EPI N ENDY   4 Term 17 37w5d  2.755 kg (6 lb 1.2 oz) M Vag-Spont EPI N ENDY      Birth Comments: no complications   3 Term 16 39w5d  2.268 kg (5 lb) F Vag-Spont  N ENDY      Birth Comments: No complications   2 Term 13 38w1d  2.835 kg (6 lb 4 oz) M Vag-Spont EPI  ENDY      Birth Comments: System Generated. Please review and update pregnancy details.   1 Term 12 39w4d  3.118 kg (6 lb 14 oz) M Vag-Vacuum EPI N ENDY      Birth Comments: Pt states no complications        Nyquil  Patient Active Problem List    Diagnosis Date Noted   • Postpartum care and examination of lactating mother 2021   • Group beta Strep positive 2021   • UTI on intake- needs FRANCES 2021   • Encounter for supervision of normal pregnancy in third trimester 2020        Hospital Course:   25 y.o. , now para 5, was admitted with the above mentioned diagnosis, underwent Active Labor, vaginal, spontaneous. Patient postpartum course was unremarkable, with progressive advancement in diet , ambulation and toleration of oral analgesia. Patient without complaints today and desires discharge.      Vitals:    21 1800 21 2200 21 0200 21 0600   BP: 101/69 107/71 110/70 118/72    Pulse: 92 79 82 76   Resp: 20 20 18 18   Temp: 36.4 °C (97.5 °F) 36.8 °C (98.2 °F) 36.7 °C (98 °F) 36.6 °C (97.9 °F)   TempSrc: Temporal Temporal Temporal Temporal   SpO2: 97% 97% 98% 97%   Weight:       Height:           Current Facility-Administered Medications   Medication Dose   • ibuprofen (MOTRIN) tablet 800 mg  800 mg   • LR infusion     • tetanus-dipth-acell pertussis (Tdap) inj 0.5 mL  0.5 mL   • measles, mumps and rubella vaccine (MMR) injection 0.5 mL  0.5 mL   • PRN oxytocin (PITOCIN) (20 Units/1000 mL) PRN for excessive uterine bleeding - See Admin Instr  125-999 mL/hr   • miSOPROStol (CYTOTEC) tablet 600 mcg  600 mcg   • methylergonovine (METHERGINE) injection 0.2 mg  0.2 mg   • carboPROST (HEMABATE) injection 250 mcg  250 mcg   • docusate sodium (COLACE) capsule 100 mg  100 mg   • oxytocin (PITOCIN) 20 UNITS/1000ML LR (postpartum)  999 mL/hr   • oxytocin (PITOCIN) 20 UNITS/1000ML LR (postpartum)   mL/hr   • acetaminophen (TYLENOL) tablet 1,000 mg  1,000 mg   • oxytocin (PITOCIN) infusion (for induction)  0.5-20 beatriz-units/min   • ropivacaine 0.2 % (NAROPIN) injection         Exam:  Breast Exam: negative  Abdomen: Abdomen soft, non-tender. BS normal. No masses,  No organomegaly  Fundus Non Tender: yes  Incision: none  Perineum: repair well approximated  Extremity: extremities, peripheral pulses and reflexes normal, no edema, redness or tenderness in the calves or thighs     Labs:  Recent Labs     05/05/21  1435 05/06/21  1052   WBC 12.9* 15.4*   RBC 4.32 3.62*   HEMOGLOBIN 10.9* 9.1*   HEMATOCRIT 34.2* 28.7*   MCV 79.2* 79.3*   MCH 25.2* 25.1*   MCHC 31.9* 31.7*   RDW 38.6 38.4   PLATELETCT 328 293   MPV 10.1 10.3        Activity:   Discharge to home  Pelvic Rest x 6 weeks    Assessment:  normal postpartum course  Discharge Assessment: No areas of skin breakdown/redness; surgical incision intact/healing     Follow up: [facility] in 5 weeks for vaginal ; 1 week for incision  check.      Discharge Meds:   Current Outpatient Medications   Medication Sig Dispense Refill   • ibuprofen (MOTRIN) 800 MG Tab Take 1 tablet by mouth every 8 hours as needed (For cramping after delivery; do not give if patient is receiving ketorolac (Toradol)). 30 tablet 1   • ferrous sulfate 325 (65 Fe) MG tablet Take 1 tablet by mouth every day. 30 tablet 0       MellYESSY Rae

## 2021-05-07 NOTE — DISCHARGE INSTRUCTIONS
PATIENT DISCHARGE EDUCATION INSTRUCTION SHEET  REASONS TO CALL YOUR OBSTETRICIAN  · Persistent fever, shaking, chills (Temperature higher than 100.4) may indicate you have an infection  · Heavy bleeding: soaking more than 1 pad per hour; Passing clots an egg-sized clot or bigger may mean you have an postpartum hemorrhage  · Foul odor from vagina or bad smelling or discolored discharge or blood  · Breast infection (Mastitis symptoms); breast pain, chills, fever, redness or red streaks, may feel flu like symptoms  · Urinary pain, burning or frequency  · Incision that is not healing, increased redness, swelling, tenderness or pain, or any pus from episiotomy or  site may mean you have an infection  · Redness, swelling, warmth, or painful to touch in the calf area of your leg may mean you have a blood clot  · Severe or intensified depression, thoughts or feelings of wanting to hurt yourself or someone else   · Pain in chest, obstructed breathing or shortness of breath (trouble catching your breath) may mean you are having a postpartum complication. Call your provider immediately   · Headache that does not get better, even after taking medicine, a bad headache with vision changes or pain in the upper right area of your belly may mean you have high blood pressure or post birth preeclampsia. Call your provider immediately    HAND WASHING  All family and friends should wash their hands:  · Before and after holding the baby  · Before feeding the baby  · After using the restroom or changing the baby's diaper    WOUND CARE  Ask your physician for additional care instructions. In general:  ·  Incision:  · May shower and pat incision dry   · Keep the incision clean and dry  · There should not be any opening or pus from the incision  · Continue to walk at home 3 times a day   · Do NOT lift anything heavier than your baby (over 10 pounds)  · Encourage family to help participate in care of the  to allow  rest and mom time to heal  · Episiotomy/Laceration  · May use jose-spray bottle, witch hazel pads and dermaplast spray for comfort  · Use jose-spray bottle after urinating to cleanse perineal area  · To prevent burning during urination spray jose-water bottle on labial area   · Pat perineal area dry until episiotomy/laceration is healed  · Continue to use jose-bottle until bleeding stops as needed  · If have a 2nd degree laceration or greater, a Sitz bath can offer relief from soreness, burning, and inflammation   · Sitz Bath   · Sit in 6 inches of warm water and soak laceration as needed until the laceration heals    VAGINAL CARE AND BLEEDING  · Nothing inside vagina for 6 weeks:   · No sexual intercourse, tampons or douching  · Bleeding may continue for 2-4 weeks. Amount and color may vary  · Soaking 1 pad or more in an hour for several hours is considered heavy bleeding  · Passing large egg sized blood clots can be concerning  · If you feel like you have heavy bleeding or are having increasing amount of blood clots call your Obstetrician immediately  · If you begin feeling faint upon standing, feeling sick to your stomach, have clammy skin, a really fast heartbeat, have chills, start feeling confused, dizzy, sleepy or weak, or feeling like you're going to faint call your Obstetrician immediately    HYPERTENSION   Preeclampsia or gestational hypertension are types of high blood pressure that only pregnant women can get. It is important for you to be aware of symptoms to seek early intervention and treatment. If you have any of these symptoms immediately call your Obstetrician    · Vision changes or blurred vision   · Severe headache or pain that is unrelieved with medication and will not go away  · Persistent pain in upper abdomen or shoulder   · Increased swelling of face, feet, or hands  · Difficulty breathing or shortness of breath at rest  · Urinating less than usual    URINATION AND BOWEL MOVEMENTS  · Eating  "more fiber (bran cereal, fruits, and vegetables) and drinking plenty of fluids will help to avoid constipation  · Urinary frequency and urgency after childbirth is normal  · If you experience any urinary pain, burning or frequency call your provider    BIRTH CONTROL  · It is possible to become pregnant at any time after delivery and while breastfeeding  · Plan to discuss a method of birth control with your physician at your post delivery follow up visit    POSTPARTUM BLUES  During the first few days after birth, you may experience a sense of the \"blues\" which may include impatience, irritability or even crying. These feelings come and go quickly. However, as many as 1 in 10 women experience emotional symptoms known as postpartum depression.     POSTPARTUM DEPRESSION    May start as early as the second or third day after delivery or take several weeks or months to develop. Symptoms of \"blues\" are present, but are more intense: Crying spells; loss of appetite; feelings of hopelessness or loss of control; fear of touching the baby; over concern or no concern at all about the baby; little or no concern about your own appearance/caring for yourself; and/or inability to sleep or excessive sleeping. Contact your Obstetrician if you are experiencing any of these symptoms     PREVENTING SHAKEN BABY  If you are angry or stressed, PUT THE BABY IN THE CRIB, step away, take some deep breaths, and wait until you are calm to care for the baby. DO NOT SHAKE THE BABY. You are not alone, call a supporter for help.  · [facility] 24/7 crisis call line (282-066-4810) or (1-392.213.1609)  · You can also text them, text \"ANSWER\" (729476)      "

## 2021-05-07 NOTE — LACTATION NOTE
This note was copied from a baby's chart.  @0943 LC met with MOB for initial visit, MOB states baby hs been breastfeeding well however she reports some minor discomfort when she breastfeeds, reminded of the importance of a deep latch and the damage caused by a shallow latch, LC provided latch assistance to latch baby more deeply, with minimal assistance MOB reports increased comfort with a deep latch, baby latched easily and a nutritive suck was noted    Plan:  Ad sindhu breastfeeding at least Q 4 hours (mor often if feeding cues noted)    MOB has WIC, she is aware of outpatient assistance available at [facility] after discharge    Zoom meeting information provided    MOB denies having any additional questions or concerns for LC at this time    Encouraged to call for assistance as needed

## 2021-05-16 ENCOUNTER — APPOINTMENT (OUTPATIENT)
Dept: RADIOLOGY | Facility: MEDICAL CENTER | Age: 26
End: 2021-05-16
Attending: EMERGENCY MEDICINE
Payer: MEDICAID

## 2021-05-16 ENCOUNTER — HOSPITAL ENCOUNTER (EMERGENCY)
Facility: MEDICAL CENTER | Age: 26
End: 2021-05-16
Attending: EMERGENCY MEDICINE
Payer: MEDICAID

## 2021-05-16 ENCOUNTER — NURSE TRIAGE (OUTPATIENT)
Dept: HEALTH INFORMATION MANAGEMENT | Facility: OTHER | Age: 26
End: 2021-05-16

## 2021-05-16 VITALS
SYSTOLIC BLOOD PRESSURE: 113 MMHG | HEIGHT: 66 IN | OXYGEN SATURATION: 96 % | WEIGHT: 165 LBS | HEART RATE: 85 BPM | DIASTOLIC BLOOD PRESSURE: 67 MMHG | BODY MASS INDEX: 26.52 KG/M2 | RESPIRATION RATE: 16 BRPM | TEMPERATURE: 97.2 F

## 2021-05-16 DIAGNOSIS — N30.01 ACUTE CYSTITIS WITH HEMATURIA: ICD-10-CM

## 2021-05-16 DIAGNOSIS — R50.9 FEVER, UNSPECIFIED FEVER CAUSE: ICD-10-CM

## 2021-05-16 DIAGNOSIS — R30.0 DYSURIA: ICD-10-CM

## 2021-05-16 LAB
ALBUMIN SERPL BCP-MCNC: 3.7 G/DL (ref 3.2–4.9)
ALBUMIN/GLOB SERPL: 1.2 G/DL
ALP SERPL-CCNC: 123 U/L (ref 30–99)
ALT SERPL-CCNC: 15 U/L (ref 2–50)
ANION GAP SERPL CALC-SCNC: 14 MMOL/L (ref 7–16)
APPEARANCE UR: CLEAR
AST SERPL-CCNC: 14 U/L (ref 12–45)
BACTERIA #/AREA URNS HPF: ABNORMAL /HPF
BACTERIA GENITAL QL WET PREP: NORMAL
BASOPHILS # BLD AUTO: 0.2 % (ref 0–1.8)
BASOPHILS # BLD: 0.03 K/UL (ref 0–0.12)
BILIRUB SERPL-MCNC: <0.2 MG/DL (ref 0.1–1.5)
BILIRUB UR QL STRIP.AUTO: NEGATIVE
BUN SERPL-MCNC: 11 MG/DL (ref 8–22)
CALCIUM SERPL-MCNC: 8.7 MG/DL (ref 8.4–10.2)
CHLORIDE SERPL-SCNC: 103 MMOL/L (ref 96–112)
CO2 SERPL-SCNC: 20 MMOL/L (ref 20–33)
COLOR UR: YELLOW
CREAT SERPL-MCNC: 0.51 MG/DL (ref 0.5–1.4)
EOSINOPHIL # BLD AUTO: 0.14 K/UL (ref 0–0.51)
EOSINOPHIL NFR BLD: 1 % (ref 0–6.9)
EPI CELLS #/AREA URNS HPF: ABNORMAL /HPF
ERYTHROCYTE [DISTWIDTH] IN BLOOD BY AUTOMATED COUNT: 40.9 FL (ref 35.9–50)
GLOBULIN SER CALC-MCNC: 3.1 G/DL (ref 1.9–3.5)
GLUCOSE SERPL-MCNC: 105 MG/DL (ref 65–99)
GLUCOSE UR STRIP.AUTO-MCNC: NEGATIVE MG/DL
HCG SERPL QL: NEGATIVE
HCT VFR BLD AUTO: 34.1 % (ref 37–47)
HGB BLD-MCNC: 10.6 G/DL (ref 12–16)
IMM GRANULOCYTES # BLD AUTO: 0.04 K/UL (ref 0–0.11)
IMM GRANULOCYTES NFR BLD AUTO: 0.3 % (ref 0–0.9)
KETONES UR STRIP.AUTO-MCNC: ABNORMAL MG/DL
LEUKOCYTE ESTERASE UR QL STRIP.AUTO: ABNORMAL
LYMPHOCYTES # BLD AUTO: 1.44 K/UL (ref 1–4.8)
LYMPHOCYTES NFR BLD: 10 % (ref 22–41)
MCH RBC QN AUTO: 25.2 PG (ref 27–33)
MCHC RBC AUTO-ENTMCNC: 31.1 G/DL (ref 33.6–35)
MCV RBC AUTO: 81.2 FL (ref 81.4–97.8)
MICRO URNS: ABNORMAL
MONOCYTES # BLD AUTO: 0.68 K/UL (ref 0–0.85)
MONOCYTES NFR BLD AUTO: 4.7 % (ref 0–13.4)
MUCOUS THREADS #/AREA URNS HPF: ABNORMAL /HPF
NEUTROPHILS # BLD AUTO: 12.11 K/UL (ref 2–7.15)
NEUTROPHILS NFR BLD: 83.8 % (ref 44–72)
NITRITE UR QL STRIP.AUTO: NEGATIVE
NRBC # BLD AUTO: 0 K/UL
NRBC BLD-RTO: 0 /100 WBC
PH UR STRIP.AUTO: 6.5 [PH] (ref 5–8)
PLATELET # BLD AUTO: 444 K/UL (ref 164–446)
PMV BLD AUTO: 9.3 FL (ref 9–12.9)
POTASSIUM SERPL-SCNC: 4 MMOL/L (ref 3.6–5.5)
PROT SERPL-MCNC: 6.8 G/DL (ref 6–8.2)
PROT UR QL STRIP: NEGATIVE MG/DL
RBC # BLD AUTO: 4.2 M/UL (ref 4.2–5.4)
RBC # URNS HPF: ABNORMAL /HPF
RBC UR QL AUTO: ABNORMAL
SIGNIFICANT IND 70042: NORMAL
SITE SITE: NORMAL
SODIUM SERPL-SCNC: 137 MMOL/L (ref 135–145)
SOURCE SOURCE: NORMAL
SP GR UR STRIP.AUTO: 1.02
WBC # BLD AUTO: 14.4 K/UL (ref 4.8–10.8)
WBC #/AREA URNS HPF: ABNORMAL /HPF

## 2021-05-16 PROCEDURE — 87186 SC STD MICRODIL/AGAR DIL: CPT

## 2021-05-16 PROCEDURE — 87086 URINE CULTURE/COLONY COUNT: CPT

## 2021-05-16 PROCEDURE — 36415 COLL VENOUS BLD VENIPUNCTURE: CPT

## 2021-05-16 PROCEDURE — 700105 HCHG RX REV CODE 258: Performed by: EMERGENCY MEDICINE

## 2021-05-16 PROCEDURE — 84703 CHORIONIC GONADOTROPIN ASSAY: CPT

## 2021-05-16 PROCEDURE — 85025 COMPLETE CBC W/AUTO DIFF WBC: CPT

## 2021-05-16 PROCEDURE — 76856 US EXAM PELVIC COMPLETE: CPT

## 2021-05-16 PROCEDURE — 80053 COMPREHEN METABOLIC PANEL: CPT

## 2021-05-16 PROCEDURE — 700111 HCHG RX REV CODE 636 W/ 250 OVERRIDE (IP): Performed by: EMERGENCY MEDICINE

## 2021-05-16 PROCEDURE — 87491 CHLMYD TRACH DNA AMP PROBE: CPT

## 2021-05-16 PROCEDURE — 87591 N.GONORRHOEAE DNA AMP PROB: CPT

## 2021-05-16 PROCEDURE — 81001 URINALYSIS AUTO W/SCOPE: CPT

## 2021-05-16 PROCEDURE — 99284 EMERGENCY DEPT VISIT MOD MDM: CPT

## 2021-05-16 PROCEDURE — 96365 THER/PROPH/DIAG IV INF INIT: CPT

## 2021-05-16 PROCEDURE — 87077 CULTURE AEROBIC IDENTIFY: CPT | Mod: 91

## 2021-05-16 RX ORDER — CEFDINIR 300 MG/1
300 CAPSULE ORAL 2 TIMES DAILY
Qty: 20 CAPSULE | Refills: 0 | Status: SHIPPED | OUTPATIENT
Start: 2021-05-16 | End: 2021-05-26

## 2021-05-16 RX ORDER — ONDANSETRON 4 MG/1
4 TABLET, ORALLY DISINTEGRATING ORAL EVERY 6 HOURS PRN
Qty: 10 TABLET | Refills: 0 | Status: SHIPPED | OUTPATIENT
Start: 2021-05-16

## 2021-05-16 RX ADMIN — CEFTRIAXONE SODIUM 1 G: 1 INJECTION, POWDER, FOR SOLUTION INTRAMUSCULAR; INTRAVENOUS at 02:24

## 2021-05-16 ASSESSMENT — FIBROSIS 4 INDEX
FIB4 SCORE: 0.39
FIB4 SCORE: 0.39

## 2021-05-16 NOTE — LETTER
5/18/2021               Gissell Millererrat Dylan Darlene  [address]  [address]  [place] [address]        Dear Gissell (MR#[id number])    This letter is sent in regards to your recent visit to the [facility] Emergency Department on 5/16/2021. During the visit, tests were performed to assist the physician in your medical diagnosis. A review of your tests requires that we notify you of the following:    Your urine culture was POSITIVE for a bacteria called Escherichia coli. The antibiotic prescribed for you (cefdinir) should be active to treat this bacteria. It is important that you continue taking your antibiotic until it is finished.     Please feel free to contact me at the number below if you have any questions or concerns. Thank you for your cooperation in the matter.    Sincerely,  ED Culture Follow-Up Staff  Marie Gordon, PharmD    Former[facility], Emergency Department  [address] [place], [place] [id number] [phone] (ED Culture Line)

## 2021-05-16 NOTE — ED PROVIDER NOTES
ED Provider Note    CHIEF COMPLAINT  Chief Complaint   Patient presents with   • Fever     Pt presents with complaint of fever and chills that started today. She delivered a baby 1 week ago and is concerned she may have an infection. Ibuprofin taken at 11p tonight. Denies cough and SOB. No fever present in triage     HPI  Patient is a 25-year-old female with prior hx of recurrent UTIs who presents to the Er for concerns of fever following recent vaginal delivery approximately 1 week ago.  She had recent dysuria, recent fevers chills that started yesterday.  She reports having a fever up to 102 that she measured orally, took ibuprofen with some relief.  She also developed body aches and discomfort.  She has had normal lochia without true vaginal pain or excessive discharge and has not had any diarrheal illness, no gross abdominal distention, no chest pain, shortness of breath or any skin lesions.  She does not have a history of prior sexually transmitted infections and was told that if she develops fevers following her vaginal delivery that she should be evaluated.    PPE Note: I personally donned full PPE for all patient encounters during this visit, including being clean-shaven with an N95 respirator mask, gloves, and goggles.     REVIEW OF SYSTEMS  See HPI for further details. All other systems are negative.     PAST MEDICAL HISTORY   has a past medical history of N&V - nausea and vomiting (7/7/2011) and UTI (urinary tract infection).    SOCIAL HISTORY  Social History     Tobacco Use   • Smoking status: Never Smoker   • Smokeless tobacco: Never Used   Vaping Use   • Vaping Use: Never used   Substance and Sexual Activity   • Alcohol use: Not Currently   • Drug use: No   • Sexual activity: Yes     Partners: Male     SURGICAL HISTORY  patient denies any surgical history    CURRENT MEDICATIONS  Home Medications    **Home medications have not yet been reviewed for this encounter**       ALLERGIES  Allergies   Allergen  "Reactions   • Nyquil Swelling     PHYSICAL EXAM  VITAL SIGNS: /67   Pulse 85   Temp 36.2 °C (97.2 °F)   Resp 16   Ht 1.676 m (5' 6\")   Wt 74.8 kg (165 lb)   LMP 08/08/2020   SpO2 96%   BMI 26.63 kg/m²   Pulse ox interpretation: I interpret this pulse ox as normal.  Genl: F sitting in gurney comfortably, speaking clearly, appears anxious but in no acute distress   Head: NC/AT   ENT: Mucous membranes moist, posterior pharynx clear, uvula midline, nares patent bilaterally   Eyes: Normal sclera, pupils equal round reactive to light  Neck: Supple, FROM, no LAD appreciated  Pulmonary: Lungs are clear to auscultation bilaterally  Chest: No TTP  CV:  tachycardia, no murmur appreciated, pulses 2+ in both upper and lower extremities,  Abdomen: soft, mild suprapubic tenderness.  No mcburneys point tenderness, no rebount or guarding.  ND; no masses palpated, no HSM  : no CVA tenderness. No vaginal lesions or vesicles. Cervical os is closed. No blood present. No cervical motion tenderness. No adnexal tenderness bilaterally. No pus present.  Musculoskeletal: Pain free ROM of the neck. Moving upper and lower extremities and spontaneous in coordinated fashion  Neuro: A&Ox4 (person, place, time, situation), speech fluent, gait steady, no focal deficits appreciated  Psych: Patient has an appropriate affect and behavior  Skin: No rash or lesions.  No pallor or jaundice.  No cyanosis.  Warm and dry.     DIAGNOSTIC STUDIES / PROCEDURES    LABS  Labs Reviewed   CBC WITH DIFFERENTIAL - Abnormal; Notable for the following components:       Result Value    WBC 14.4 (*)     Hemoglobin 10.6 (*)     Hematocrit 34.1 (*)     MCV 81.2 (*)     MCH 25.2 (*)     MCHC 31.1 (*)     Neutrophils-Polys 83.80 (*)     Lymphocytes 10.00 (*)     Neutrophils (Absolute) 12.11 (*)     All other components within normal limits   COMP METABOLIC PANEL - Abnormal; Notable for the following components:    Glucose 105 (*)     Alkaline Phosphatase 123 " (*)     All other components within normal limits   URINALYSIS,CULTURE IF INDICATED - Abnormal; Notable for the following components:    Ketones Trace (*)     Leukocyte Esterase Small (*)     Occult Blood Small (*)     All other components within normal limits    Narrative:     Indication for culture:->Patient WITHOUT an indwelling Jones  catheter in place with new onset of Dysuria, Frequency,  Urgency, and/or Suprapubic pain   URINE MICROSCOPIC (W/UA) - Abnormal; Notable for the following components:    WBC 10-20 (*)     RBC 2-5 (*)     Bacteria Moderate (*)     All other components within normal limits    Narrative:     Indication for culture:->Patient WITHOUT an indwelling Jones  catheter in place with new onset of Dysuria, Frequency,  Urgency, and/or Suprapubic pain   HCG QUAL SERUM   URINE CULTURE(NEW)    Narrative:     Indication for culture:->Patient WITHOUT an indwelling Jones  catheter in place with new onset of Dysuria, Frequency,  Urgency, and/or Suprapubic pain   WET PREP   CHLAMYDIA/GC PCR URINE OR SWAB   ESTIMATED GFR     RADIOLOGY  US-PELVIC COMPLETE (TRANSABDOMINAL/TRANSVAGINAL) (COMBO)   Final Result      Thickened/heterogeneous endometrial stripe likely contains hemorrhage related to recent delivery. No low resistance Doppler flow to suggest retained products of congestion.        COURSE & MEDICAL DECISION MAKING  Pertinent Labs & Imaging studies reviewed. (See chart for details)    DDX:  UTI, nephrolithiasis, pyelitis, pyelonephritis, vaginitis, endometritis is unlikely, cervicitis unlikely, retained POC, sepsis    MDM    Initial evaluation at 0137:  Patient presents emergency room for symptoms as described above.  She is roughly a week out from vaginal delivery and is concerned about her history of recurrent urinary tract infections.  She had a fever measured at home as high as 102 that was responding to antipyretics.  At the time of my initial evaluation she appears to have some discomfort, is  anxious and is tachycardic but afebrile.  Labs were collected for the broad differential as noted above and she had a leukocytosis of 14.4 with a leftward shift.  There is identifiable bacteria, WBCs with minimal RBCs and positive leukocyte esterase.  There is no gross vaginal discharge, no cervical motion tenderness, no gross adnexal discomfort and based on her recent vaginal delivery I did consider the possibility of endometritis/retained products of conception.  While she was tachycardic and has a leukocytosis and initially meet septic criteria she is very fluid responsive and there is some element of anxiety and possible initial pain and discomfort and she has no subsequent episodes of tachycardia nor did she develop a fever while here in the emergency department.  Blood pressures remained stable, she feels substantially improved following administration of Rocephin and I believe this likely represents underlying fluid responsiveness.  Pregnancy test is negative, there is no abnormal growth noted on wet prep and chlamydia testing is currently pending but she does not have concerns for acute infection.  She has not had diffuse tenderness consistent with PID or Bam-Hitesh Aleks and I believe it is most important to treat her for the likely urogenital source and she will be started on cefdinir for 10 days duration with very strict return precautions.  UTI with possibility of early pyelitis/pyelonephritis with stable vital signs here in the emergency department.  Questions are addressed and she will be discharged home in stable condition with instructions on repeat follow-up if not improved within 24 to 48 hours.    FINAL IMPRESSION  Visit Diagnoses     ICD-10-CM   1. Dysuria  R30.0   2. Fever, unspecified fever cause  R50.9   3. Acute cystitis with hematuria  N30.01     Electronically signed by: Garcia Aguilar M.D., 5/16/2021 1:36 AM

## 2021-05-16 NOTE — TELEPHONE ENCOUNTER
Pt went to ED yesterday with chills and temperature.  She woke up this morning with fatigue, 102. Temp, headache, body aches, Shaking and feeling cold but body is hot.    Pt. Had baby 05/06. Pt reports she started bleeding more yesterday.     Reason for Disposition  • Caller has question and triager able to answer question    Additional Information  • Negative: Severe difficulty breathing (e.g., struggling for each breath, speaks in single words)  • Negative: Sounds like a life-threatening emergency to the triager  • Negative: [1] Recent hospitalization for pneumonia AND [2] taking an antibiotic  • Negative: [1] Animal bite infection AND [2] taking an antibiotic  • Negative: [1] Cellulitis AND [2] taking an antibiotic  • Negative: [1] Ear  infection (Otitis Media) AND [2] taking an antibiotic  • Negative: [1] Ear  infection (Swimmer's Ear) AND [2] taking an antibiotic  • Negative: [1] Sinus infection AND [2] taking an antibiotic  • Negative: [1] Strep throat AND [2] taking an antibiotic  • Negative: [1] Urinary tract  infection (e.g., cystitis, pyelonephritis, urethritis, epididymitis) AND [2] male AND [3] taking an antibiotic  • Negative: [1] Urinary tract  infection (e.g., cystitis, pyelonephritis, urethritis) AND [2] female AND [3] taking an antibiotic  • Negative: [1] Wound infection AND [2] taking an antibiotic  • Negative: MODERATE difficulty breathing (e.g., speaks in phrases, SOB even at rest, pulse 100-120)  • Negative: Fever > 103 F (39.4 C)  • Negative: Patient sounds very sick or weak to the triager  • Negative: [1] Taking antibiotics > 24 hours AND [2] symptoms WORSE  • Negative: [1] Recent hospitalization AND [2] symptoms WORSE  • Negative: [1] Diabetes mellitus or weak immune system (e.g., HIV positive, cancer chemo, splenectomy, organ transplant, chronic steroids) AND [2] new onset of fever > 100.0 F (37.8 C)  • Negative: [1] Caller has URGENT question AND [2] triager unable to answer question  •  "Negative: [1] Taking antibiotic AND [2] new onset of fever  • Negative: [1] Taking antibiotic > 48 hours (2 days) AND [2] fever still present (SAME)  • Negative: [1] Taking antibiotic > 72 hours (3 days) AND [2] symptoms (other than fever) not improved  • Negative: [1] Caller has NON-URGENT question AND [2] triager unable to answer question  • Negative: [1] Finished taking antibiotics AND [2] symptoms are BETTER but [3] not completely gone  • Negative: [1] Taking antibiotic AND [2] symptoms are BETTER AND [3] no fever  • Negative: [1] Taking antibiotics < 48 hours AND [2] fever still present (SAME)  • Negative: [1] Taking antibiotic < 72 hours (3 days) AND [2] symptoms are SAME (not improved)    Answer Assessment - Initial Assessment Questions  1. INFECTION: \"What infection is the antibiotic being given for?\"      UTI  2. ANTIBIOTIC: \"What antibiotic are you taking\" \"How many times per day?\"      Unknown.  Pt boyfriend is picking prescription up from pharmacy  3. DURATION: \"When was the antibiotic started?\"      Has not started antibiotic yet  4. MAIN CONCERN OR SYMPTOM:  \"What is your main concern right now?\"      Temp 102 not feeling well, fatigue  5. BETTER-SAME-WORSE: \"Are you getting better, staying the same, or getting worse compared to when you first started the antibiotics?\" If getting worse, ask: \"In what way?\"       same  6. FEVER: \"Do you have a fever?\" If so, ask: \"What is your temperature, how was it measured, and when did it start?\"      102.  7. SYMPTOMS: \"Are there any other symptoms you're concerned about?\" If so, ask: \"When did it start?\"      Pt had vaginal delivery 10 days ago  8. FOLLOW-UP APPOINTMENT: \"Do you have a follow-up appointment with your doctor?\"      no    Protocols used: INFECTION ON ANTIBIOTIC FOLLOW-UP CALL-A-      "

## 2021-05-17 LAB
C TRACH DNA SPEC QL NAA+PROBE: NEGATIVE
N GONORRHOEA DNA SPEC QL NAA+PROBE: NEGATIVE
SPECIMEN SOURCE: NORMAL

## 2021-05-18 NOTE — ED NOTES
ED Positive Culture Follow-up/Notification Note:    Date: 5/18/21     Patient seen in the ED on 5/16/2021 for dysuria & fevers. Patient received Rocephin 1g IV x 1 in the ED.     1. Dysuria    2. Fever, unspecified fever cause    3. Acute cystitis with hematuria       Discharge Medication List as of 5/16/2021  3:42 AM      START taking these medications    Details   cefdinir (OMNICEF) 300 MG Cap Take 1 capsule by mouth 2 times a day for 10 days., Disp-20 capsule, R-0, Print Rx Paper      ondansetron (ZOFRAN ODT) 4 MG TABLET DISPERSIBLE Take 1 tablet by mouth every 6 hours as needed., Disp-10 tablet, R-0, Print Rx Paper             Allergies: Nyquil     Vitals:    05/16/21 0147 05/16/21 0247 05/16/21 0316 05/16/21 0347   BP: 102/60 104/57 100/65 113/67   Pulse: 89 74 69 85   Resp: 16 18 16   Temp:       SpO2: 96% 96% 95% 96%   Weight:       Height:           Final cultures:   Results     Procedure Component Value Units Date/Time    URINE CULTURE(NEW) [[id number]]  (Abnormal)  (Susceptibility) Collected: 05/16/21 0106    Order Status: Completed Specimen: Urine Updated: 05/18/21 0759     Significant Indicator POS     Source UR     Site -     Culture Result -      Escherichia coli  ,000 cfu/mL      Narrative:      Indication for culture:->Patient WITHOUT an indwelling Jones  catheter in place with new onset of Dysuria, Frequency,  Urgency, and/or Suprapubic pain    Susceptibility     Escherichia coli (1)     Antibiotic Interpretation Microscan Method Status    Ampicillin Sensitive <=8 mcg/mL WILLOW Final    Ceftriaxone Sensitive <=1 mcg/mL WILLOW Final    Cefazolin Sensitive <=2 mcg/mL WILLOW Final    Ciprofloxacin Sensitive <=0.25 mcg/mL WILLOW Final    Cefepime Sensitive <=2 mcg/mL WILLOW Final    Cefuroxime Sensitive <=4 mcg/mL WILLOW Final    Ampicillin/sulbactam Sensitive <=4/2 mcg/mL WILLOW Final    Tobramycin Sensitive <=2 mcg/mL WILLOW Final    Nitrofurantoin Sensitive <=32 mcg/mL WILLOW Final    Gentamicin Sensitive <=2 mcg/mL WILLOW  Final    Levofloxacin Sensitive <=0.5 mcg/mL WILLOW Final    Pip/Tazobactam Sensitive <=8 mcg/mL WILLOW Final    Trimeth/Sulfa Sensitive <=0.5/9.5 mcg/mL WILLOW Final    Tigecycline Sensitive <=2 mcg/mL WILLOW Final                   Chlamydia/GC PCR Urine Or Swab [[id number]] Collected: 05/16/21 0210    Order Status: Completed Specimen: Genital Updated: 05/17/21 2243     C. trachomatis by PCR Negative     N. gonorrhoeae by PCR Negative     Source Vaginal    WET PREP [[id number]] Collected: 05/16/21 0210    Order Status: Completed Specimen: Genital from Cervical Updated: 05/16/21 0233     Significant Indicator NEG     Source GEN     Site CERVICAL     Wet Prep For Parasites No yeast.  No motile Trichomonas seen.  No clue cells seen.  Moderate WBCs seen.      URINALYSIS CULTURE, IF INDICATED [[id number]]  (Abnormal) Collected: 05/16/21 0106    Order Status: Completed Specimen: Blood from Urine, Clean Catch Updated: 05/16/21 0157     Color Yellow     Character Clear     Specific Gravity 1.020     Ph 6.5     Glucose Negative mg/dL      Ketones Trace mg/dL      Protein Negative mg/dL      Bilirubin Negative     Nitrite Negative     Leukocyte Esterase Small     Occult Blood Small     Micro Urine Req Microscopic    Narrative:      Indication for culture:->Patient WITHOUT an indwelling Jones  catheter in place with new onset of Dysuria, Frequency,  Urgency, and/or Suprapubic pain          Plan:   Appropriate antibiotic therapy prescribed. No changes required based upon culture result.  Sent letter to patient to notify of positive culture result and encourage compliance with prescribed antibiotics.     Marie Gordon, PharmD

## 2022-04-18 ENCOUNTER — APPOINTMENT (OUTPATIENT)
Dept: RADIOLOGY | Facility: MEDICAL CENTER | Age: 27
End: 2022-04-18
Attending: EMERGENCY MEDICINE

## 2022-04-18 ENCOUNTER — HOSPITAL ENCOUNTER (EMERGENCY)
Facility: MEDICAL CENTER | Age: 27
End: 2022-04-18
Attending: EMERGENCY MEDICINE

## 2022-04-18 VITALS
DIASTOLIC BLOOD PRESSURE: 68 MMHG | BODY MASS INDEX: 25.56 KG/M2 | HEART RATE: 66 BPM | RESPIRATION RATE: 18 BRPM | OXYGEN SATURATION: 97 % | TEMPERATURE: 98.2 F | WEIGHT: 138.89 LBS | SYSTOLIC BLOOD PRESSURE: 110 MMHG | HEIGHT: 62 IN

## 2022-04-18 DIAGNOSIS — R07.9 CHEST PAIN, UNSPECIFIED TYPE: ICD-10-CM

## 2022-04-18 LAB
ALBUMIN SERPL BCP-MCNC: 4.4 G/DL (ref 3.2–4.9)
ALBUMIN/GLOB SERPL: 1.3 G/DL
ALP SERPL-CCNC: 118 U/L (ref 30–99)
ALT SERPL-CCNC: 17 U/L (ref 2–50)
ANION GAP SERPL CALC-SCNC: 10 MMOL/L (ref 7–16)
AST SERPL-CCNC: 17 U/L (ref 12–45)
BASOPHILS # BLD AUTO: 0.5 % (ref 0–1.8)
BASOPHILS # BLD: 0.04 K/UL (ref 0–0.12)
BILIRUB SERPL-MCNC: 0.2 MG/DL (ref 0.1–1.5)
BUN SERPL-MCNC: 11 MG/DL (ref 8–22)
CALCIUM SERPL-MCNC: 9.5 MG/DL (ref 8.4–10.2)
CHLORIDE SERPL-SCNC: 102 MMOL/L (ref 96–112)
CO2 SERPL-SCNC: 22 MMOL/L (ref 20–33)
CREAT SERPL-MCNC: 0.5 MG/DL (ref 0.5–1.4)
EKG IMPRESSION: NORMAL
EOSINOPHIL # BLD AUTO: 0.17 K/UL (ref 0–0.51)
EOSINOPHIL NFR BLD: 2 % (ref 0–6.9)
ERYTHROCYTE [DISTWIDTH] IN BLOOD BY AUTOMATED COUNT: 37.5 FL (ref 35.9–50)
FLUAV RNA SPEC QL NAA+PROBE: NEGATIVE
FLUBV RNA SPEC QL NAA+PROBE: NEGATIVE
GFR SERPLBLD CREATININE-BSD FMLA CKD-EPI: 131 ML/MIN/1.73 M 2
GLOBULIN SER CALC-MCNC: 3.3 G/DL (ref 1.9–3.5)
GLUCOSE SERPL-MCNC: 91 MG/DL (ref 65–99)
HCG SERPL QL: NEGATIVE
HCT VFR BLD AUTO: 40.2 % (ref 37–47)
HGB BLD-MCNC: 13.7 G/DL (ref 12–16)
IMM GRANULOCYTES # BLD AUTO: 0.02 K/UL (ref 0–0.11)
IMM GRANULOCYTES NFR BLD AUTO: 0.2 % (ref 0–0.9)
LYMPHOCYTES # BLD AUTO: 3.15 K/UL (ref 1–4.8)
LYMPHOCYTES NFR BLD: 36.5 % (ref 22–41)
MCH RBC QN AUTO: 29 PG (ref 27–33)
MCHC RBC AUTO-ENTMCNC: 34.1 G/DL (ref 33.6–35)
MCV RBC AUTO: 85 FL (ref 81.4–97.8)
MONOCYTES # BLD AUTO: 0.5 K/UL (ref 0–0.85)
MONOCYTES NFR BLD AUTO: 5.8 % (ref 0–13.4)
NEUTROPHILS # BLD AUTO: 4.75 K/UL (ref 2–7.15)
NEUTROPHILS NFR BLD: 55 % (ref 44–72)
NRBC # BLD AUTO: 0 K/UL
NRBC BLD-RTO: 0 /100 WBC
PLATELET # BLD AUTO: 287 K/UL (ref 164–446)
PMV BLD AUTO: 10.4 FL (ref 9–12.9)
POTASSIUM SERPL-SCNC: 3.9 MMOL/L (ref 3.6–5.5)
PROT SERPL-MCNC: 7.7 G/DL (ref 6–8.2)
RBC # BLD AUTO: 4.73 M/UL (ref 4.2–5.4)
RSV RNA SPEC QL NAA+PROBE: NEGATIVE
SARS-COV-2 RNA RESP QL NAA+PROBE: NOTDETECTED
SODIUM SERPL-SCNC: 134 MMOL/L (ref 135–145)
SPECIMEN SOURCE: NORMAL
TROPONIN T SERPL-MCNC: <6 NG/L (ref 6–19)
WBC # BLD AUTO: 8.6 K/UL (ref 4.8–10.8)

## 2022-04-18 PROCEDURE — 36415 COLL VENOUS BLD VENIPUNCTURE: CPT

## 2022-04-18 PROCEDURE — 80053 COMPREHEN METABOLIC PANEL: CPT

## 2022-04-18 PROCEDURE — 93005 ELECTROCARDIOGRAM TRACING: CPT | Performed by: EMERGENCY MEDICINE

## 2022-04-18 PROCEDURE — 700117 HCHG RX CONTRAST REV CODE 255: Performed by: EMERGENCY MEDICINE

## 2022-04-18 PROCEDURE — 99284 EMERGENCY DEPT VISIT MOD MDM: CPT

## 2022-04-18 PROCEDURE — 71275 CT ANGIOGRAPHY CHEST: CPT

## 2022-04-18 PROCEDURE — 85025 COMPLETE CBC W/AUTO DIFF WBC: CPT

## 2022-04-18 PROCEDURE — 84484 ASSAY OF TROPONIN QUANT: CPT

## 2022-04-18 PROCEDURE — 84703 CHORIONIC GONADOTROPIN ASSAY: CPT

## 2022-04-18 PROCEDURE — C9803 HOPD COVID-19 SPEC COLLECT: HCPCS | Performed by: EMERGENCY MEDICINE

## 2022-04-18 PROCEDURE — 71045 X-RAY EXAM CHEST 1 VIEW: CPT

## 2022-04-18 PROCEDURE — 0241U HCHG SARS-COV-2 COVID-19 NFCT DS RESP RNA 4 TRGT MIC: CPT

## 2022-04-18 RX ADMIN — IOHEXOL 75 ML: 350 INJECTION, SOLUTION INTRAVENOUS at 21:22

## 2022-04-18 ASSESSMENT — FIBROSIS 4 INDEX: FIB4 SCORE: 0.22

## 2022-04-18 NOTE — ED TRIAGE NOTES
"Chief Complaint   Patient presents with   • Shortness of Breath     \"I've been having trouble breathing. It started about 2 weeks ago.\" Episodic. States she has a difficult time catching her breath, feels like, \"I have been running.\"     ED Triage Vitals [04/18/22 1612]   Enc Vitals Group      Blood Pressure 103/79      Pulse 70      Respiration 16      Temperature 36.1 °C (97 °F)      Temp src Temporal      Pulse Oximetry 98 %      Weight 63 kg (138 lb 14.2 oz)      Height 1.575 m (5' 2\")     Patient breathing regular, nonlabored at this time.  "

## 2022-04-19 NOTE — ED PROVIDER NOTES
"ED Provider Note    Scribed for Dai Woodson M.D. by Yael Mckeon. 4/18/2022  7:20 PM    Primary care provider: Pcp Pt States None  Means of arrival: Walk-in  History obtained from: Patient  History limited by: None    CHIEF COMPLAINT  Chief Complaint   Patient presents with    Shortness of Breath     \"I've been having trouble breathing. It started about 2 weeks ago.\" Episodic. States she has a difficult time catching her breath, feels like, \"I have been running.\"       HPI  Gissell Colon is a 26 y.o. female who presents to the Emergency Department for episodes of chest pain onset 2 weeks ago. These episodes begin with shortness of breath accompanied by tingling in her upper extremities and light headedness. She feels as if she \"forgets how to breathe\" during these episodes. No alleviating or exacerbating factors identified. She denies leg swelling, palpitations, fevers, headaches, nausea, and vomiting. She denies any major past medical history. She is currently breastfeeding. She was diagnosed with COVID-19 in 11/2021.     REVIEW OF SYSTEMS  CARDIAC: chest pain, no palpitations    PULMONARY: shortness of breath  GI: no nausea, vomiting  Neuro: tingling in upper extremities, light headedness  Musculoskeletal: no leg swelling  Endocrine: no fevers or chills    See history of present illness. All other systems are negative. C.    PAST MEDICAL HISTORY   has a past medical history of N&V - nausea and vomiting (7/7/2011) and UTI (urinary tract infection).    SURGICAL HISTORY  patient denies any surgical history    SOCIAL HISTORY  Social History     Tobacco Use    Smoking status: Never Smoker    Smokeless tobacco: Never Used   Vaping Use    Vaping Use: Never used   Substance Use Topics    Alcohol use: Not Currently    Drug use: No      Social History     Substance and Sexual Activity   Drug Use No       FAMILY HISTORY  Family History   Problem Relation Age of Onset    Diabetes Maternal Grandfather     " "No Known Problems Mother     No Known Problems Father     No Known Problems Sister        CURRENT MEDICATIONS  Current Outpatient Medications   Medication Instructions    FeroSul 325 mg, Oral, DAILY    ibuprofen (MOTRIN) 200 MG Tab Take 4 Tablets by mouth every 8 hours as needed (For cramping after delivery).    ondansetron (ZOFRAN ODT) 4 mg, Oral, EVERY 6 HOURS PRN    Prenatal Multivit-Min-Fe-FA (PRENATAL VITAMINS PO) Oral       ALLERGIES  Allergies   Allergen Reactions    Nyquil Swelling       PHYSICAL EXAM  VITAL SIGNS: /79   Pulse 70   Temp 36.1 °C (97 °F) (Temporal)   Resp 16   Ht 1.575 m (5' 2\")   Wt 63 kg (138 lb 14.2 oz)   LMP 03/28/2022 (Within Weeks)   SpO2 98%   BMI 25.40 kg/m²     Constitutional: Well developed, Well nourished, No acute distress, Non-toxic appearance.   HEENT: Normocephalic, Atraumatic,  external ears normal, pharynx pink,  Mucous  Membranes moist, No rhinorrhea or mucosal edema  Eyes: PERRL, EOMI, Conjunctiva normal, No discharge.   Neck: Normal range of motion, No tenderness, Supple, No stridor.   Lymphatic: No lymphadenopathy    Cardiovascular: Regular Rate and Rhythm, No murmurs,  rubs, or gallops.   Thorax & Lungs: Lungs clear to auscultation bilaterally, No respiratory distress, No wheezes, rales or rhonchi, No chest wall tenderness.   Abdomen: Bowel sounds normal, Soft, non tender, non distended,  No pulsatile masses., no rebound guarding or peritoneal signs.   Skin: Warm, Dry, No erythema, No rash,   Back:  No CVA tenderness,  No spinal tenderness, bony crepitance, step offs, or instability.   Neurologic: Alert & oriented clear speech no focal deficits  Extremities: Equal, intact distal pulses, No cyanosis, clubbing or edema,  No tenderness.   Musculoskeletal: Good range of motion in all major joints. No tenderness to palpation or major deformities noted.     DIAGNOSTIC STUDIES / PROCEDURES    LABS  Results for orders placed or performed during the hospital " encounter of 04/18/22   CoV-2, FLU A/B, and RSV by PCR ([facility]) : Collect NP swab in VTM    Specimen: Respirate   Result Value Ref Range    Influenza virus A RNA Negative Negative    Influenza virus B, PCR Negative Negative    RSV, PCR Negative Negative    SARS-CoV-2 by PCR NotDetected     SARS-CoV-2 Source NP Swab    CBC WITH DIFFERENTIAL   Result Value Ref Range    WBC 8.6 4.8 - 10.8 K/uL    RBC 4.73 4.20 - 5.40 M/uL    Hemoglobin 13.7 12.0 - 16.0 g/dL    Hematocrit 40.2 37.0 - 47.0 %    MCV 85.0 81.4 - 97.8 fL    MCH 29.0 27.0 - 33.0 pg    MCHC 34.1 33.6 - 35.0 g/dL    RDW 37.5 35.9 - 50.0 fL    Platelet Count 287 164 - 446 K/uL    MPV 10.4 9.0 - 12.9 fL    Neutrophils-Polys 55.00 44.00 - 72.00 %    Lymphocytes 36.50 22.00 - 41.00 %    Monocytes 5.80 0.00 - 13.40 %    Eosinophils 2.00 0.00 - 6.90 %    Basophils 0.50 0.00 - 1.80 %    Immature Granulocytes 0.20 0.00 - 0.90 %    Nucleated RBC 0.00 /100 WBC    Neutrophils (Absolute) 4.75 2.00 - 7.15 K/uL    Lymphs (Absolute) 3.15 1.00 - 4.80 K/uL    Monos (Absolute) 0.50 0.00 - 0.85 K/uL    Eos (Absolute) 0.17 0.00 - 0.51 K/uL    Baso (Absolute) 0.04 0.00 - 0.12 K/uL    Immature Granulocytes (abs) 0.02 0.00 - 0.11 K/uL    NRBC (Absolute) 0.00 K/uL   COMP METABOLIC PANEL   Result Value Ref Range    Sodium 134 (L) 135 - 145 mmol/L    Potassium 3.9 3.6 - 5.5 mmol/L    Chloride 102 96 - 112 mmol/L    Co2 22 20 - 33 mmol/L    Anion Gap 10.0 7.0 - 16.0    Glucose 91 65 - 99 mg/dL    Bun 11 8 - 22 mg/dL    Creatinine 0.50 0.50 - 1.40 mg/dL    Calcium 9.5 8.4 - 10.2 mg/dL    AST(SGOT) 17 12 - 45 U/L    ALT(SGPT) 17 2 - 50 U/L    Alkaline Phosphatase 118 (H) 30 - 99 U/L    Total Bilirubin 0.2 0.1 - 1.5 mg/dL    Albumin 4.4 3.2 - 4.9 g/dL    Total Protein 7.7 6.0 - 8.2 g/dL    Globulin 3.3 1.9 - 3.5 g/dL    A-G Ratio 1.3 g/dL   HCG QUAL SERUM   Result Value Ref Range    Beta-Hcg Qualitative Serum Negative Negative   ESTIMATED GFR   Result Value Ref Range    GFR  (CKD-EPI) 131 >60 mL/min/1.73 m 2   TROPONIN   Result Value Ref Range    Troponin T <6 6 - 19 ng/L   EKG (NOW)   Result Value Ref Range    Report       [facility] Emergency Dept.    Test Date:  2022  Pt Name:    CHUY AYALA         Department: [facility]  MRN:        [id number]                      Room:  Gender:     Female                       Technician: EO  :        1995                   Requested By:SIRI MARIE  Order #:    [id number]                    Reading MD: VIKTOR SORIA MD    Measurements  Intervals                                Axis  Rate:       72                           P:          -15  MO:         134                          QRS:        19  QRSD:       95                           T:          15  QT:         380  QTc:        416    Interpretive Statements  Unknown rhythm, irregular rate  RSR' in V1 or V2, probably normal variant  Baseline wander in lead(s) V2  No previous ECG available for comparison  Electronically Signed On 2022 19:19:24 PDT by VIKTOR SORIA MD         All labs reviewed by me.    EKG  12 lead EKG; Interpreted by emergency department physician as shown above.    RADIOLOGY  CT-CTA CHEST PULMONARY ARTERY W/ RECONS   Final Result      1.  No pulmonary embolus appreciated.      DX-CHEST-PORTABLE (1 VIEW)   Final Result      No acute cardiopulmonary abnormality.        The radiologist's interpretation of all radiological studies have been reviewed by me.    COURSE & MEDICAL DECISION MAKING  Nursing notes, VS, PMSFHx reviewed in chart.    7:20 PM -  Patient seen and examined at bedside. Ordered CT-CTA Chest Pulmonary Artery, DX-Chest,  Troponin, Estimated GFR, CoV-2, Flu A/B, and RSV by PCR, CBC with diff, CMP, HCG Qualitative, and EKG to evaluate her symptoms. The differential diagnoses include but are not limited to: atypical chest pain, pulmonary embolism, costochondritis, anxiety    9:45 PM - Patient was reevaluated at  bedside. Discussed lab and radiology results with the patient and informed them about the plan for discharge at this time. Patient verbalizes understanding and agreement to this plan of care.      Heart Score: 1     The patient will return for new or worsening symptoms and is stable at the time of discharge.    The patient is referred to a primary physician for blood pressure management, diabetic screening, and for all other preventative health concerns.      DISPOSITION:  Patient will be discharged home in stable condition.    FOLLOW UP:  No follow-up provider specified. Pt referred to a pcp    FINAL IMPRESSION  1. Chest pain, unspecified type          I, Yael Mckeon (Aziza), am scribing for, and in the presence of, Dai Woodson M.D..    Electronically signed by: Yael Mckeon (Aziza), 4/18/2022    IDai M.D. personally performed the services described in this documentation, as scribed by Yael Mckeon in my presence, and it is both accurate and complete.    The note accurately reflects work and decisions made by me.  Dai Woodson M.D.  4/18/2022  11:22 PM

## 2022-04-20 ENCOUNTER — PATIENT OUTREACH (OUTPATIENT)
Dept: HEALTH INFORMATION MANAGEMENT | Facility: OTHER | Age: 27
End: 2022-04-20

## 2022-04-20 NOTE — PROGRESS NOTES
CHW received contact info from [facility]. CHW called patient and left a voice message with contact info for WENDY. CHW also left contact info for CCM. CHW will not follow.

## 2022-04-23 ENCOUNTER — HOSPITAL ENCOUNTER (EMERGENCY)
Facility: MEDICAL CENTER | Age: 27
End: 2022-04-24
Attending: STUDENT IN AN ORGANIZED HEALTH CARE EDUCATION/TRAINING PROGRAM

## 2022-04-23 VITALS
SYSTOLIC BLOOD PRESSURE: 108 MMHG | OXYGEN SATURATION: 99 % | HEIGHT: 62 IN | RESPIRATION RATE: 16 BRPM | DIASTOLIC BLOOD PRESSURE: 61 MMHG | WEIGHT: 138.45 LBS | BODY MASS INDEX: 25.48 KG/M2 | HEART RATE: 71 BPM | TEMPERATURE: 97.3 F

## 2022-04-23 DIAGNOSIS — R06.02 SHORTNESS OF BREATH: Primary | ICD-10-CM

## 2022-04-23 LAB
EKG IMPRESSION: NORMAL
HCG UR QL: NEGATIVE

## 2022-04-23 PROCEDURE — 81025 URINE PREGNANCY TEST: CPT

## 2022-04-23 PROCEDURE — 93005 ELECTROCARDIOGRAM TRACING: CPT

## 2022-04-23 PROCEDURE — 99283 EMERGENCY DEPT VISIT LOW MDM: CPT

## 2022-04-23 PROCEDURE — 93005 ELECTROCARDIOGRAM TRACING: CPT | Performed by: STUDENT IN AN ORGANIZED HEALTH CARE EDUCATION/TRAINING PROGRAM

## 2022-04-23 ASSESSMENT — ENCOUNTER SYMPTOMS
SHORTNESS OF BREATH: 1
DOUBLE VISION: 0
NERVOUS/ANXIOUS: 1
SORE THROAT: 0
VOMITING: 0
ABDOMINAL PAIN: 0
FEVER: 0
BLURRED VISION: 0
CHILLS: 0
HEADACHES: 0
NECK PAIN: 0
NAUSEA: 0
FALLS: 0
DIZZINESS: 1
COUGH: 0
LOSS OF CONSCIOUSNESS: 0

## 2022-04-23 ASSESSMENT — FIBROSIS 4 INDEX: FIB4 SCORE: 0.39

## 2022-04-24 NOTE — ED PROVIDER NOTES
ED Provider Note    Chief Complaint:   Shortness of breath    HPI:  Gissell Colon is a very pleasant 26-year-old female currently breast-feeding, no past medical history who presents with shortness of breath for several weeks, hand tingling, lightheadedness.  Patient reports that she presented on Monday for similar symptoms and had a full evaluation and was cleared for any sort of acute cardiopulmonary process.  Patient reports that the symptoms typically last for about 20 minutes and do improve with walking.  Patient reports that they started out at 2 PM today.  Patient reports some sharp, pressure-like chest pain to the substernal area that radiates to the collarbone.  Patient denies other upper respiratory symptoms.    Review of Systems:  Review of Systems   Constitutional: Negative for chills and fever.   HENT: Negative for congestion and sore throat.    Eyes: Negative for blurred vision and double vision.   Respiratory: Positive for shortness of breath. Negative for cough.    Cardiovascular: Positive for chest pain. Negative for leg swelling.   Gastrointestinal: Negative for abdominal pain, nausea and vomiting.   Genitourinary: Negative for dysuria and hematuria.   Musculoskeletal: Negative for falls and neck pain.   Skin: Negative for itching and rash.   Neurological: Positive for dizziness. Negative for loss of consciousness and headaches.   Psychiatric/Behavioral: The patient is nervous/anxious.        Past Medical History:   has a past medical history of N&V - nausea and vomiting (7/7/2011) and UTI (urinary tract infection).    Social History:  Social History     Tobacco Use   • Smoking status: Never Smoker   • Smokeless tobacco: Never Used   Vaping Use   • Vaping Use: Never used   Substance and Sexual Activity   • Alcohol use: Not Currently   • Drug use: No   • Sexual activity: Yes     Partners: Male       Surgical History:  patient denies any surgical history    Allergies:  Allergies  Impression: Rheumatoid arthritis, unspecified Plan: see above note. "  Allergen Reactions   • Nyquil Swelling       Physical Exam:  Vital Signs: /70   Pulse 76   Temp 36.8 °C (98.3 °F) (Temporal)   Resp 16   Ht 1.575 m (5' 2\")   Wt 62.8 kg (138 lb 7.2 oz)   LMP 2022 (Within Weeks)   SpO2 99%   BMI 25.32 kg/m²   Physical Exam  Vitals and nursing note reviewed.   Constitutional:       Comments: Patient is lying in bed supine, pleasant, conversant, speaking in complete sentences   HENT:      Head: Normocephalic and atraumatic.   Eyes:      Extraocular Movements: Extraocular movements intact.      Conjunctiva/sclera: Conjunctivae normal.      Pupils: Pupils are equal, round, and reactive to light.   Cardiovascular:      Pulses: Normal pulses.      Comments: HR 75  Pulmonary:      Effort: Pulmonary effort is normal. No respiratory distress.      Breath sounds: No decreased breath sounds, wheezing, rhonchi or rales.   Abdominal:      Comments: Abdomen is soft, non-tender, non-distended, non-rigid, no rebound, guarding, masses, no McBurney's point tenderness, no peritoneal signs, negative Rovsing sign, negative Hillman sign.  No CVA tenderness to palpation. Benign abdomen.   Musculoskeletal:         General: No swelling. Normal range of motion.      Cervical back: Normal range of motion. No rigidity.   Skin:     General: Skin is warm and dry.      Capillary Refill: Capillary refill takes less than 2 seconds.   Neurological:      Mental Status: She is alert.         Medical records reviewed for continuity of care.     Results for orders placed or performed during the hospital encounter of 22   HCG QUALITATIVE UR   Result Value Ref Range    Beta-Hcg Urine Negative Negative   EKG   Result Value Ref Range    Report       [facility] Emergency Dept.    Test Date:  2022  Pt Name:    CHUY AYALA         Department: [facility]  MRN:        [id number]                      Room:  Gender:     Female                       Technician: hola NARAYANB:     "    1995                   Requested By:ER TRIAGE PROTOCOL  Order #:    [id number]                    Rahul MD: Jhonny Baldwin MD    Measurements  Intervals                                Axis  Rate:       77                           P:          74  RI:         128                          QRS:        49  QRSD:       123                          T:          42  QT:         373  QTc:        423    Interpretive Statements  Sinus rhythm  RSR prime in V1  Normal axis  No ST elevations or ST depressions  No T wave inversions  Electronically Signed On 4- 22:30:20 PDT by Jhonny Baldwin MD         Radiology:  No orders to display        MDM:  EKG unchanged from baseline, no arrhythmia or ACS.  Patient was evaluated for equivalent symptoms on Monday and received a comprehensive work-up including CT angio of the chest and no evidence of acute pulmonary process or chronic pulmonary process or acute pulmonary embolism were identified.  Patient had normal CBC, CMP at that time as well.  Patient is amenable to outpatient follow-up with PCP and therapist for cognitive behavioral therapy for potential pain disorder versus generalized anxiety disorder.  Disposition pending urine pregnancy test.    Electronically signed by: Jhonny Baldwin M.D., 4/23/2022, 10:27 PM    Patient urine pregnancy test is negative, IUP versus ectopic are inconsistent with patient presentation at this time.  Oxygen saturation remains within normal limits.  Patient has been given information regarding cognitive behavioral therapy and counseled to follow-up with therapist for treatment for potential panic disorder.  No evidence of cardiopulmonary process on evaluation on last visit and no indication for repeat extensive work-up at this time.  Patient is amenable to this plan.    Repeat physical exam benign.  I doubt any serious emergency process at this time.  Patient and/or family, friends given strict return precautions and care  instructions. They have demonstrated understanding of discharge instructions through teach back mechanism. Advised PCP follow-up in 1-2 days.  Patient/family/friend expresses understanding and agrees to plan.    This dictation has been created using voice recognition software. I am continuously working with the software to minimize the number of voice recognition errors and I have made every attempt to manually correct the errors within my dictation. However errors  related to this voice recognition software may still exist and should be interpreted within the appropriate context.     Electronically signed by: Jhonny Baldwin M.D., 4/23/2022 11:17 PM      Disposition:  Home    Final Impression:  1. Shortness of breath

## 2022-04-24 NOTE — ED TRIAGE NOTES
"26 yr old female to triage  Chief Complaint   Patient presents with   • Shortness of Breath     Patient report of shortness of breath, chest pressure, lightheaded, and bilateral hand tingling sensation for the past 2 weeks.  Seen here recently for the same thing and unable to follow up with a doctor.  Speaks in full sentences.  Oxygen saturation at 98% RA      Patient was seen here on the 04/18/2022 for the same symptoms.      /70   Pulse 76   Temp 36.8 °C (98.3 °F) (Temporal)   Resp 16   Ht 1.575 m (5' 2\")   Wt 62.8 kg (138 lb 7.2 oz)   LMP 03/28/2022 (Within Weeks)   SpO2 99%   BMI 25.32 kg/m²     "

## 2022-12-01 ENCOUNTER — TELEPHONE (OUTPATIENT)
Dept: MEDICAL GROUP | Facility: CLINIC | Age: 27
End: 2022-12-01

## 2022-12-01 NOTE — TELEPHONE ENCOUNTER
MSW Intern reached out to patient on behalf of provider. Provider requested that patient receive resource assistance for obtaining  and legal documentation. Called patient, conversation resulted in letter being written instead. MSW Intern wrote letter consisting of resource of [employer] [facility] [employer] [phone]). Included printout of webpage in Estonian with letter. Included in letter to please have patient bring up any possible issues with provider.

## 2023-01-24 ENCOUNTER — HOSPITAL ENCOUNTER (EMERGENCY)
Facility: MEDICAL CENTER | Age: 28
End: 2023-01-24
Attending: EMERGENCY MEDICINE

## 2023-01-24 VITALS
OXYGEN SATURATION: 96 % | HEART RATE: 62 BPM | BODY MASS INDEX: 26.33 KG/M2 | SYSTOLIC BLOOD PRESSURE: 109 MMHG | DIASTOLIC BLOOD PRESSURE: 63 MMHG | WEIGHT: 143.08 LBS | HEIGHT: 62 IN | RESPIRATION RATE: 18 BRPM | TEMPERATURE: 98 F

## 2023-01-24 DIAGNOSIS — K62.5 RECTAL BLEEDING: ICD-10-CM

## 2023-01-24 DIAGNOSIS — K64.9 HEMORRHOIDS, UNSPECIFIED HEMORRHOID TYPE: ICD-10-CM

## 2023-01-24 DIAGNOSIS — K64.8 INTERNAL HEMORRHOID, BLEEDING: ICD-10-CM

## 2023-01-24 PROCEDURE — 99282 EMERGENCY DEPT VISIT SF MDM: CPT

## 2023-01-24 RX ORDER — HYDROCORTISONE ACETATE 25 MG/1
25 SUPPOSITORY RECTAL 3 TIMES DAILY
Qty: 20 SUPPOSITORY | Refills: 0 | Status: SHIPPED | OUTPATIENT
Start: 2023-01-24 | End: 2023-01-31

## 2023-01-24 ASSESSMENT — FIBROSIS 4 INDEX: FIB4 SCORE: 0.4

## 2023-01-24 NOTE — ED TRIAGE NOTES
"Chief Complaint   Patient presents with    Rectal Bleeding     X2 weeks \"bright red blood, when I wipe and every time I go to the bathroom I notice it.\" Pt also reports feeling pressure in rectum, denies pain.      /66   Pulse 79   Temp 36.7 °C (98 °F) (Temporal)   Resp 18   Ht 1.575 m (5' 2\")   Wt 64.9 kg (143 lb 1.3 oz)   LMP 01/10/2023   SpO2 98%   Breastfeeding Yes   BMI 26.17 kg/m²       "

## 2023-01-24 NOTE — ED PROVIDER NOTES
"  ER Provider Note    Scribed for Dieudonne Jaimes M.d. by Sj Ponce. 1/24/2023  12:43 PM    Primary Care Provider: Pcp Pt States None    CHIEF COMPLAINT  Chief Complaint   Patient presents with    Rectal Bleeding     X2 weeks \"bright red blood, when I wipe and every time I go to the bathroom I notice it.\" Pt also reports feeling pressure in rectum, denies pain.      EXTERNAL RECORDS REVIEWED  Other None pertinent to today's visit    HPI/ROS  LIMITATION TO HISTORY   Select: : None  OUTSIDE HISTORIAN(S):  None    Gissell Colon is a 27 y.o. female who presents to the ED complaining of rectal bleeding with started 2 weeks ago. She states that 2 weeks ago she experienced cramping and her rectum started bleeding when using the bathroom. Now she states that whether or not she's having a bowel movement she's experiencing bright red bleeding from her rectum. She complains of associated pressure, but states she does not have any noticeable pain. She states she has no history of hemorrhoids.    PAST MEDICAL HISTORY  Past Medical History:   Diagnosis Date    N&V - nausea and vomiting 7/7/2011    UTI (urinary tract infection)     admitted for 6 days early 2015     SURGICAL HISTORY  History reviewed. No pertinent surgical history.    FAMILY HISTORY  Family History   Problem Relation Age of Onset    Diabetes Maternal Grandfather     No Known Problems Mother     No Known Problems Father     No Known Problems Sister        SOCIAL HISTORY   reports that she has never smoked. She has never used smokeless tobacco. She reports that she does not currently use alcohol. She reports that she does not use drugs.    CURRENT MEDICATIONS  Previous Medications    FERROUS SULFATE 325 (65 FE) MG TABLET    Take 1 tablet by mouth every day.    IBUPROFEN (MOTRIN) 200 MG TAB    Take 4 Tablets by mouth every 8 hours as needed (For cramping after delivery).    ONDANSETRON (ZOFRAN ODT) 4 MG TABLET DISPERSIBLE    Take 1 tablet by mouth " "every 6 hours as needed.    PRENATAL MULTIVIT-MIN-FE-FA (PRENATAL VITAMINS PO)    Take  by mouth.     ALLERGIES  Nyquil    PHYSICAL EXAM  /66   Pulse 79   Temp 36.7 °C (98 °F) (Temporal)   Resp 18   Ht 1.575 m (5' 2\")   Wt 64.9 kg (143 lb 1.3 oz)   LMP 01/10/2023   SpO2 98%   Breastfeeding Yes   BMI 26.17 kg/m²   Nursing note and vitals reviewed.  Constitutional: Well-developed and well-nourished. No distress.   HENT: Head is normocephalic and atraumatic. Oropharynx is clear and moist without exudate or erythema.   Eyes: Pupils are equal, round, and reactive to light. Conjunctiva are normal.   Cardiovascular: Normal rate and regular rhythm. No murmur heard. Normal radial pulses.  Pulmonary/Chest: Breath sounds normal. No wheezes or rales.   Abdominal: Soft and non-tender. No distention    Rectum: mild tenderness at 6 o clock position, evidence of reducable bleeding internal hemorrhoid. No abscess or thrombosis.  No apparent fissure.  Musculoskeletal: Extremities exhibit normal range of motion without edema or tenderness.   Neurological: Awake, alert and oriented to person, place, and time. No focal deficits noted.  Skin: Skin is warm and dry. No rash.   Psychiatric: Normal mood and affect. Appropriate for clinical situation    COURSE & MEDICAL DECISION MAKING     ED Observation Status? No; Patient does not meet criteria for ED Observation.     INITIAL ASSESSMENT AND PLAN      12:46 PM - Patient seen and examined at bedside. Discussed plan of care, including outpatient use of Anusol suppositories. I explained their use, and return protocol. I plan to discharge this patient. Patient agrees to the plan of care.     DISPOSITION    Patient will be discharged home.    FOLLOW UP:  [facility], Emergency Dept  [address]  [facility] [address] [phone]    If symptoms worsen    Yuval Meraz M.D.  [address] [place] [id number] [phone]    Schedule an " appointment as soon as possible for a visit   Gi specialist    OUTPATIENT MEDICATIONS:  New Prescriptions    HYDROCORTISONE (ANUSOL-HC) 25 MG SUPPOS    Insert 1 Suppository into the rectum 3 times a day for 20 doses.     FINAL DIAGNOSIS  1. Rectal bleeding    2. Hemorrhoids, unspecified hemorrhoid type    3. Internal hemorrhoid, bleeding       Sj JOHNSTON (Aziza), am scribing for, and in the presence of, Dieudonne Jaimes M.D..    Electronically signed by: Sj Ponce (Aziza), 1/24/2023    IDieudonne M.D. personally performed the services described in this documentation, as scribed by Sj Ponce in my presence, and it is both accurate and complete.      The note accurately reflects work and decisions made by me.  Dieudonne Jaimes M.D.  1/24/2023  1:20 PM

## 2024-09-26 ENCOUNTER — APPOINTMENT (OUTPATIENT)
Dept: RADIOLOGY | Facility: MEDICAL CENTER | Age: 29
End: 2024-09-26
Attending: EMERGENCY MEDICINE

## 2024-09-26 ENCOUNTER — HOSPITAL ENCOUNTER (EMERGENCY)
Facility: MEDICAL CENTER | Age: 29
End: 2024-09-26
Attending: EMERGENCY MEDICINE

## 2024-09-26 VITALS
OXYGEN SATURATION: 100 % | WEIGHT: 147.71 LBS | RESPIRATION RATE: 12 BRPM | BODY MASS INDEX: 27.18 KG/M2 | SYSTOLIC BLOOD PRESSURE: 106 MMHG | HEART RATE: 80 BPM | DIASTOLIC BLOOD PRESSURE: 55 MMHG | TEMPERATURE: 98.1 F | HEIGHT: 62 IN

## 2024-09-26 DIAGNOSIS — R10.9 ABDOMINAL PAIN, UNSPECIFIED ABDOMINAL LOCATION: ICD-10-CM

## 2024-09-26 LAB
ALBUMIN SERPL BCP-MCNC: 4.5 G/DL (ref 3.2–4.9)
ALBUMIN/GLOB SERPL: 1.5 G/DL
ALP SERPL-CCNC: 81 U/L (ref 30–99)
ALT SERPL-CCNC: 15 U/L (ref 2–50)
ANION GAP SERPL CALC-SCNC: 15 MMOL/L (ref 7–16)
APPEARANCE UR: CLEAR
AST SERPL-CCNC: 18 U/L (ref 12–45)
BASOPHILS # BLD AUTO: 0.8 % (ref 0–1.8)
BASOPHILS # BLD: 0.07 K/UL (ref 0–0.12)
BILIRUB SERPL-MCNC: 0.3 MG/DL (ref 0.1–1.5)
BILIRUB UR QL STRIP.AUTO: NEGATIVE
BUN SERPL-MCNC: 12 MG/DL (ref 8–22)
CALCIUM ALBUM COR SERPL-MCNC: 8.4 MG/DL (ref 8.5–10.5)
CALCIUM SERPL-MCNC: 8.8 MG/DL (ref 8.4–10.2)
CHLORIDE SERPL-SCNC: 101 MMOL/L (ref 96–112)
CO2 SERPL-SCNC: 21 MMOL/L (ref 20–33)
COLOR UR: YELLOW
CREAT SERPL-MCNC: 0.58 MG/DL (ref 0.5–1.4)
EOSINOPHIL # BLD AUTO: 0.12 K/UL (ref 0–0.51)
EOSINOPHIL NFR BLD: 1.3 % (ref 0–6.9)
ERYTHROCYTE [DISTWIDTH] IN BLOOD BY AUTOMATED COUNT: 36.1 FL (ref 35.9–50)
GFR SERPLBLD CREATININE-BSD FMLA CKD-EPI: 125 ML/MIN/1.73 M 2
GLOBULIN SER CALC-MCNC: 3 G/DL (ref 1.9–3.5)
GLUCOSE SERPL-MCNC: 104 MG/DL (ref 65–99)
GLUCOSE UR STRIP.AUTO-MCNC: NEGATIVE MG/DL
HCG SERPL QL: NEGATIVE
HCT VFR BLD AUTO: 41.4 % (ref 37–47)
HGB BLD-MCNC: 14.2 G/DL (ref 12–16)
IMM GRANULOCYTES # BLD AUTO: 0.02 K/UL (ref 0–0.11)
IMM GRANULOCYTES NFR BLD AUTO: 0.2 % (ref 0–0.9)
KETONES UR STRIP.AUTO-MCNC: NEGATIVE MG/DL
LEUKOCYTE ESTERASE UR QL STRIP.AUTO: NEGATIVE
LIPASE SERPL-CCNC: 41 U/L (ref 11–82)
LYMPHOCYTES # BLD AUTO: 2.25 K/UL (ref 1–4.8)
LYMPHOCYTES NFR BLD: 25.3 % (ref 22–41)
MCH RBC QN AUTO: 29.1 PG (ref 27–33)
MCHC RBC AUTO-ENTMCNC: 34.3 G/DL (ref 32.2–35.5)
MCV RBC AUTO: 84.8 FL (ref 81.4–97.8)
MICRO URNS: NORMAL
MONOCYTES # BLD AUTO: 0.53 K/UL (ref 0–0.85)
MONOCYTES NFR BLD AUTO: 6 % (ref 0–13.4)
NEUTROPHILS # BLD AUTO: 5.91 K/UL (ref 1.82–7.42)
NEUTROPHILS NFR BLD: 66.4 % (ref 44–72)
NITRITE UR QL STRIP.AUTO: NEGATIVE
NRBC # BLD AUTO: 0 K/UL
NRBC BLD-RTO: 0 /100 WBC (ref 0–0.2)
PH UR STRIP.AUTO: 6 [PH] (ref 5–8)
PLATELET # BLD AUTO: 268 K/UL (ref 164–446)
PMV BLD AUTO: 10.1 FL (ref 9–12.9)
POTASSIUM SERPL-SCNC: 3.7 MMOL/L (ref 3.6–5.5)
PROT SERPL-MCNC: 7.5 G/DL (ref 6–8.2)
PROT UR QL STRIP: NEGATIVE MG/DL
RBC # BLD AUTO: 4.88 M/UL (ref 4.2–5.4)
RBC UR QL AUTO: NEGATIVE
SODIUM SERPL-SCNC: 137 MMOL/L (ref 135–145)
SP GR UR STRIP.AUTO: >=1.03
WBC # BLD AUTO: 8.9 K/UL (ref 4.8–10.8)

## 2024-09-26 PROCEDURE — 85025 COMPLETE CBC W/AUTO DIFF WBC: CPT

## 2024-09-26 PROCEDURE — 36415 COLL VENOUS BLD VENIPUNCTURE: CPT

## 2024-09-26 PROCEDURE — 74177 CT ABD & PELVIS W/CONTRAST: CPT

## 2024-09-26 PROCEDURE — 84703 CHORIONIC GONADOTROPIN ASSAY: CPT

## 2024-09-26 PROCEDURE — 700117 HCHG RX CONTRAST REV CODE 255: Performed by: EMERGENCY MEDICINE

## 2024-09-26 PROCEDURE — 81003 URINALYSIS AUTO W/O SCOPE: CPT

## 2024-09-26 PROCEDURE — 80053 COMPREHEN METABOLIC PANEL: CPT

## 2024-09-26 PROCEDURE — 99284 EMERGENCY DEPT VISIT MOD MDM: CPT

## 2024-09-26 PROCEDURE — 83690 ASSAY OF LIPASE: CPT

## 2024-09-26 RX ADMIN — IOHEXOL 100 ML: 350 INJECTION, SOLUTION INTRAVENOUS at 21:43

## 2024-09-26 ASSESSMENT — PAIN DESCRIPTION - PAIN TYPE: TYPE: ACUTE PAIN

## 2024-09-27 NOTE — ED TRIAGE NOTES
"Chief Complaint   Patient presents with    Abdominal Pain     LLQ pain since yesterday with swelling and cramping. Worse when siting.   Associated Nausea without vomiting or diarrhea. Denies fevers.   Reports she feels she is not able to fully empty bladder. Denies hematuria, dysuria, frequency, or urgency.      /68   Pulse 89   Temp 36.7 °C (98.1 °F) (Temporal)   Resp 16   Ht 1.575 m (5' 2\")   Wt 67 kg (147 lb 11.3 oz)   LMP 09/19/2024 (Exact Date)   SpO2 96%   BMI 27.02 kg/m²   "

## 2024-09-27 NOTE — ED PROVIDER NOTES
ER Provider Note    Scribed for Dr. Rolando Cortés M.D. by Denny Martin. 9/26/2024  8:33 PM    Primary Care Provider: Pcp Pt States None    CHIEF COMPLAINT  Chief Complaint   Patient presents with    Abdominal Pain     LLQ pain since yesterday with swelling and cramping. Worse when siting.   Associated Nausea without vomiting or diarrhea. Denies fevers.   Reports she feels she is not able to fully empty bladder. Denies hematuria, dysuria, frequency, or urgency.        EXTERNAL RECORDS REVIEWED  Normal spontaneous vaginal delivery on May 5th 2021.    HPI/ROS  LIMITATION TO HISTORY   None noted     OUTSIDE HISTORIAN(S):  None noted     Gissell Pebbles Colon is a 29 y.o. female who presents to the ED for abdominal distension and pain since yesterday patient notes pain radiates to her leg and is exacerbated by walking. Patient notes regular BMs. patient reports nausea since yesterday. Patient notes decreased urinary output since yesterday. Patient has a history of recurrent UTIs but that this feels different from previous episodes. Patient denies fever, patent denies dysuria or hematuria.    PAST MEDICAL HISTORY  Past Medical History:   Diagnosis Date    N&V - nausea and vomiting 7/7/2011    UTI (urinary tract infection)     admitted for 6 days early 2015       SURGICAL HISTORY  History reviewed. No pertinent surgical history.    FAMILY HISTORY  Family History   Problem Relation Age of Onset    Diabetes Maternal Grandfather     No Known Problems Mother     No Known Problems Father     No Known Problems Sister        SOCIAL HISTORY   reports that she has never smoked. She has never used smokeless tobacco. She reports that she does not currently use alcohol. She reports that she does not use drugs.    CURRENT MEDICATIONS  Discharge Medication List as of 9/26/2024 10:39 PM        CONTINUE these medications which have NOT CHANGED    Details   ondansetron (ZOFRAN ODT) 4 MG TABLET DISPERSIBLE Take 1 tablet by  "mouth every 6 hours as needed., Disp-10 tablet, R-0, Print Rx Paper      ibuprofen (MOTRIN) 200 MG Tab Take 4 Tablets by mouth every 8 hours as needed (For cramping after delivery)., Disp-120 tablet, R-1, Normal      ferrous sulfate 325 (65 Fe) MG tablet Take 1 tablet by mouth every day., Disp-30 tablet, R-0, Normal      Prenatal Multivit-Min-Fe-FA (PRENATAL VITAMINS PO) Take  by mouth., Historical Med             ALLERGIES  Nyquil    PHYSICAL EXAM  /68   Pulse 89   Temp 36.7 °C (98.1 °F) (Temporal)   Resp 16   Ht 1.575 m (5' 2\")   Wt 67 kg (147 lb 11.3 oz)   LMP 09/19/2024 (Exact Date)   SpO2 96%   BMI 27.02 kg/m²   Constitutional: Alert in no apparent distress.  HENT: No signs of trauma, Bilateral external ears normal, Nose normal.   Eyes: Pupils are equal and reactive, Conjunctiva normal, Non-icteric.   Neck: Normal range of motion, No tenderness, Supple,   Cardiovascular: Regular rate and rhythm, no murmurs.   Thorax & Lungs: Normal breath sounds, No respiratory distress, No wheezing, No chest tenderness.   Abdomen: Soft, tenderness to palpation in the left lower quadrant.  Skin: Warm, Dry, No erythema, No rash.   Back: No bony tenderness, No CVA tenderness.   Extremities: No edema, No cyanosis  Psychiatric: Affect normal     DIAGNOSTIC STUDIES & PROCEDURES    Labs:   Labs Reviewed   COMP METABOLIC PANEL - Abnormal; Notable for the following components:       Result Value    Glucose 104 (*)     Correct Calcium 8.4 (*)     All other components within normal limits   CBC WITH DIFFERENTIAL   LIPASE   URINALYSIS,CULTURE IF INDICATED   HCG QUAL SERUM   ESTIMATED GFR      All labs reviewed by me.    Radiology:   The attending Emergency Physician has independently interpreted the diagnostic imaging associated with this visit and is awaiting the final reading from the radiologist, which will be displayed below.    Preliminary interpretation is a follows: No intra-abdominal abscess  Radiologist " interpretation:      CT-ABDOMEN-PELVIS WITH   Final Result         1.  Hepatomegaly           COURSE & MEDICAL DECISION MAKING    ED Observation Status? No; Patient does not meet criteria for ED Observation.     INITIAL ASSESSMENT AND PLAN  Care Narrative:       8:33 PM - Patient seen and evaluated at bedside. Discussed plan of care, including plan to further evaluate. Patient agrees to plan of care. Patient will be treated as ordered for her symptoms. Ordered for labs and imaging to evaluate.    1040 PM - I reevaluated the patient at bedside. I discussed the patient's diagnostic study results which show no acute disease. I discussed plan for discharge and follow up as outlined below. The patient is stable for discharge at this time and will return for any new or worsening symptoms. Patient verbalizes understanding and support with my plan for discharge.          ADDITIONAL PROBLEM LIST AND DISPOSITION  Abdominal pain.  Ultimately intra-abdominal abscess versus infection versus urinary tract infection possible.  Ultimately the patient does not have a urine infection.  CT scan is negative.  There is no evidence of obstruction.  There is no leukocytosis.  Likely virally mediated GI upset.  Tolerating oral intake at this time.  Will discharge home with strict return precautions and follow-up.               DISPOSITION AND DISCUSSIONS  I have discussed management of the patient with the following physicians and NATHAN's:  none    Discussion of management with other [facility] or appropriate source(s): None     Escalation of care considered, and ultimately not performed: acute inpatient care management, however at this time, the patient is most appropriate for outpatient management.    Barriers to care at this time, including but not limited to:  none .     Decision tools and prescription drugs considered including, but not limited to: Pain Medications the counter pain medication .    The patient will return for new or worsening  symptoms and is stable at the time of discharge.    DISPOSITION:  Patient will be discharged home in stable condition.    FOLLOW UP:  [facility]  [address] [facility] [address]  [phone]  In 2 days        OUTPATIENT MEDICATIONS:  Discharge Medication List as of 9/26/2024 10:39 PM           FINAL IMPRESSION   1. Abdominal pain, unspecified abdominal location         Denny JOHNSTON (Aziza), am scribing for, and in the presence of, Rolando Cortés M.D..    Electronically signed by: Denny Martin (Aziza), 9/26/2024    Rolando JOHNSTON M.D. personally performed the services described in this documentation, as scribed by Denny Martin in my presence, and it is both accurate and complete.    The note accurately reflects work and decisions made by me.  Rolando Cortés M.D.  9/26/2024  11:50 PM

## 2024-12-03 ENCOUNTER — APPOINTMENT (OUTPATIENT)
Dept: RADIOLOGY | Facility: MEDICAL CENTER | Age: 29
End: 2024-12-03
Attending: EMERGENCY MEDICINE

## 2024-12-03 ENCOUNTER — HOSPITAL ENCOUNTER (EMERGENCY)
Facility: MEDICAL CENTER | Age: 29
End: 2024-12-03
Attending: EMERGENCY MEDICINE

## 2024-12-03 VITALS
RESPIRATION RATE: 18 BRPM | WEIGHT: 147.71 LBS | BODY MASS INDEX: 27.18 KG/M2 | DIASTOLIC BLOOD PRESSURE: 68 MMHG | HEIGHT: 62 IN | OXYGEN SATURATION: 98 % | SYSTOLIC BLOOD PRESSURE: 115 MMHG | TEMPERATURE: 97.7 F | HEART RATE: 76 BPM

## 2024-12-03 DIAGNOSIS — S93.401A SPRAIN OF RIGHT ANKLE, UNSPECIFIED LIGAMENT, INITIAL ENCOUNTER: ICD-10-CM

## 2024-12-03 PROCEDURE — 99284 EMERGENCY DEPT VISIT MOD MDM: CPT

## 2024-12-03 PROCEDURE — 73590 X-RAY EXAM OF LOWER LEG: CPT | Mod: RT

## 2024-12-03 PROCEDURE — A9270 NON-COVERED ITEM OR SERVICE: HCPCS | Performed by: EMERGENCY MEDICINE

## 2024-12-03 PROCEDURE — 700102 HCHG RX REV CODE 250 W/ 637 OVERRIDE(OP): Performed by: EMERGENCY MEDICINE

## 2024-12-03 PROCEDURE — 73630 X-RAY EXAM OF FOOT: CPT | Mod: RT

## 2024-12-03 RX ORDER — HYDROCODONE BITARTRATE AND ACETAMINOPHEN 5; 325 MG/1; MG/1
2 TABLET ORAL ONCE
Status: COMPLETED | OUTPATIENT
Start: 2024-12-03 | End: 2024-12-03

## 2024-12-03 RX ORDER — IBUPROFEN 600 MG/1
600 TABLET, FILM COATED ORAL ONCE
Status: COMPLETED | OUTPATIENT
Start: 2024-12-03 | End: 2024-12-03

## 2024-12-03 RX ADMIN — HYDROCODONE BITARTRATE AND ACETAMINOPHEN 2 TABLET: 5; 325 TABLET ORAL at 11:40

## 2024-12-03 RX ADMIN — IBUPROFEN 600 MG: 600 TABLET, FILM COATED ORAL at 11:41

## 2024-12-03 ASSESSMENT — FIBROSIS 4 INDEX: FIB4 SCORE: 0.52

## 2024-12-03 NOTE — ED NOTES
ERP at bedside. Pt agrees with plan of care discussed by ERP. Juvencio in low position, side rail up for pt safety. Call light within reach. Plan of care on-going

## 2024-12-03 NOTE — ED NOTES
Discharge instructions provided. Pt verbalized understanding of discharge instructions to follow up with PCP and orthopedics and to return to ER if condition worsens. Pt ambulated out of ER without difficulty.

## 2024-12-03 NOTE — ED TRIAGE NOTES
"Chief Complaint   Patient presents with    Ankle Injury     Right  Tripped and fell down two stairs at apartment  \"Definitely felt something snap in there\"      /62   Pulse 85   Temp 36.5 °C (97.7 °F) (Temporal)   Resp 15   Ht 1.575 m (5' 2\")   Wt 67 kg (147 lb 11.3 oz)   LMP 11/15/2024   SpO2 96%   BMI 27.02 kg/m²     Pt to ED via WC w/ visitors for c/o Right Ankle pain s/p slip and fall down two stairs at apartment this am, states slipped on ice and \"felt something snap.\"   "

## 2024-12-03 NOTE — ED PROVIDER NOTES
"ED Provider Note    CHIEF COMPLAINT  Chief Complaint   Patient presents with    Ankle Injury     Right  Tripped and fell down two stairs at apartment  \"Definitely felt something snap in there\"        EXTERNAL RECORDS REVIEWED  Reviewed previous women's health visits, ER encounters    HPI/ROS  LIMITATION TO HISTORY   None  OUTSIDE HISTORIAN(S):  None    Gissell Pebbles Colon is a 29 y.o. female who presents for evaluation of acute pain and injury to the right ankle foot and leg.  The patient was descending some stairs that had some ice.  She felt a snapping sensation and rolled her right ankle.  She specifically denies injury to the head neck chest abdomen or pelvis.  She is an otherwise healthy 29-year-old.  She denies pregnancy.  She did report some strain to her right shoulder but has no pain with range of motion now.  Accident occurred earlier this morning.  She was unable to bear weight after the incident and arrives here with a family member.    PAST MEDICAL HISTORY   has a past medical history of N&V - nausea and vomiting (7/7/2011) and UTI (urinary tract infection).    SURGICAL HISTORY  patient denies any surgical history    FAMILY HISTORY  Family History   Problem Relation Age of Onset    Diabetes Maternal Grandfather     No Known Problems Mother     No Known Problems Father     No Known Problems Sister        SOCIAL HISTORY  Social History     Tobacco Use    Smoking status: Never    Smokeless tobacco: Never   Vaping Use    Vaping status: Never Used   Substance and Sexual Activity    Alcohol use: Yes    Drug use: No    Sexual activity: Yes     Partners: Male   No drug or alcohol abuse    CURRENT MEDICATIONS  No current facility-administered medications for this encounter.    Current Outpatient Medications:     ondansetron (ZOFRAN ODT) 4 MG TABLET DISPERSIBLE, Take 1 tablet by mouth every 6 hours as needed., Disp: 10 tablet, Rfl: 0    ibuprofen (MOTRIN) 200 MG Tab, Take 4 Tablets by mouth every 8 " "hours as needed (For cramping after delivery)., Disp: 120 tablet, Rfl: 1    ferrous sulfate 325 (65 Fe) MG tablet, Take 1 tablet by mouth every day., Disp: 30 tablet, Rfl: 0    Prenatal Multivit-Min-Fe-FA (PRENATAL VITAMINS PO), Take  by mouth., Disp: , Rfl:         ALLERGIES  Allergies   Allergen Reactions    Nyquil Swelling       PHYSICAL EXAM  VITAL SIGNS: /62   Pulse 85   Temp 36.5 °C (97.7 °F) (Temporal)   Resp 15   Ht 1.575 m (5' 2\")   Wt 67 kg (147 lb 11.3 oz)   LMP 11/15/2024   SpO2 96%   BMI 27.02 kg/m²    Pulse ox interpretation: I interpret this pulse ox as normal.  Constitutional: Alert and oriented x 3, no acute distress  HEENT: Atraumatic normocephalic, pupils are equal round reactive to light extraocular movements are intact. The nares is clear, external ears are normal, mouth shows moist mucous membranes normal dentition for age  Neck: Supple, no JVD no tracheal deviation no midline C-spine tenderness  Cardiovascular: Regular rate and rhythm no murmur rub or gallop 2+ pulses peripherally x4  Thorax & Lungs: No respiratory distress, no wheezes rales or rhonchi, No chest tenderness.   GI: Soft nontender nondistended positive bowel sounds, no peritoneal signs no rebound or guarding  Skin: Warm dry no acute rash or lesion  Musculoskeletal: Moving all extremities with full range and 5 of 5 strength right lower extremity exam is notable for some bony tenderness to palpation over the fibular head.  Compartments of the leg are soft.  There is no bony tenderness along the shaft of the tibia.  Achilles tendon appears to be intact with negative Berkowitz's test.  There is swelling and pain over the dorsal aspect of the midfoot without midfoot instability.  There is no large ankle effusion.  Minimal bony tenderness noted over the lateral malleolus   neurologic: Cranial nerves III through XII are grossly intact no sensory deficit no cerebellar dysfunction   Psychiatric: Appropriate affect for " situation at this time          EKG/LABS  No laboratory studies clinically indicated  RADIOLOGY/PROCEDURES   I have independently interpreted the diagnostic imaging associated with this visit and am waiting the final reading from the radiologist.   My preliminary interpretation is as follows: No evidence of foot ankle or tib-fib fracture    Radiologist interpretation:  DX-TIBIA AND FIBULA RIGHT   Final Result      No evidence of fracture or dislocation.      DX-FOOT-COMPLETE 3+ RIGHT   Final Result      No evidence of acute fracture or dislocation.          COURSE & MEDICAL DECISION MAKING    ASSESSMENT, COURSE AND PLAN  Care Narrative:     This is a very pleasant 29-year-old female who had a mechanical slip and fall on ice earlier this morning.  She primarily complained of foot and ankle and leg pain.  She had some transient shoulder pain that resolved and some strain in her back but no direct injury therefore did not feel that dedicated imaging studies of her lumbar spine or right shoulder was clinically indicated.  Here she was given oral Norco and ibuprofen.  Radiographs of the right leg and foot including the ankle did not demonstrate any fracture or dislocation.  I suspect she has high-grade ankle sprain.  She will be placed in orthopedic boot and crutches.  I recommended rest ice and elevation.  I will recommend high-dose NSAIDs and ice therapy.          ADDITIONAL PROBLEMS MANAGED      DISPOSITION AND DISCUSSIONS  I have discussed management of the patient with the following physicians and NATHAN's: None    Discussion of management with other [facility] or appropriate source(s): None    Escalation of care considered, and ultimately not performed: Considered additional imaging studies    Barriers to care at this time, including but not limited to: No PCP.     Decision tools and prescription drugs considered including, but not limited to: None.    FINAL DIAGNOSIS  1. Sprain of right ankle, unspecified ligament, initial  encounter               Electronically signed by: Cristhian Green M.D., 12/3/2024 11:28 AM